# Patient Record
Sex: FEMALE | Race: WHITE | NOT HISPANIC OR LATINO | Employment: UNEMPLOYED | URBAN - METROPOLITAN AREA
[De-identification: names, ages, dates, MRNs, and addresses within clinical notes are randomized per-mention and may not be internally consistent; named-entity substitution may affect disease eponyms.]

---

## 2017-11-03 ENCOUNTER — HISTORICAL (OUTPATIENT)
Dept: INTERNAL MEDICINE | Facility: CLINIC | Age: 67
End: 2017-11-03

## 2017-11-03 LAB
ABS NEUT (OLG): 4.33 X10(3)/MCL (ref 2.1–9.2)
ALBUMIN SERPL-MCNC: 3.7 GM/DL (ref 3.4–5)
ALBUMIN/GLOB SERPL: 1 RATIO (ref 1–2)
ALP SERPL-CCNC: 115 UNIT/L (ref 45–117)
ALT SERPL-CCNC: 15 UNIT/L (ref 12–78)
AST SERPL-CCNC: 12 UNIT/L (ref 15–37)
BASOPHILS # BLD AUTO: 0.07 X10(3)/MCL
BASOPHILS NFR BLD AUTO: 1 % (ref 0–1)
BILIRUB SERPL-MCNC: 0.5 MG/DL (ref 0.2–1)
BILIRUBIN DIRECT+TOT PNL SERPL-MCNC: 0.1 MG/DL
BILIRUBIN DIRECT+TOT PNL SERPL-MCNC: 0.4 MG/DL
BUN SERPL-MCNC: 18 MG/DL (ref 7–18)
CALCIUM SERPL-MCNC: 8.9 MG/DL (ref 8.5–10.1)
CHLORIDE SERPL-SCNC: 107 MMOL/L (ref 98–107)
CHOLEST SERPL-MCNC: 191 MG/DL
CHOLEST/HDLC SERPL: 3.3 {RATIO} (ref 0–4.4)
CO2 SERPL-SCNC: 29 MMOL/L (ref 21–32)
CREAT SERPL-MCNC: 0.7 MG/DL (ref 0.6–1.3)
EOSINOPHIL # BLD AUTO: 0.21 X10(3)/MCL
EOSINOPHIL NFR BLD AUTO: 3 % (ref 0–5)
ERYTHROCYTE [DISTWIDTH] IN BLOOD BY AUTOMATED COUNT: 13.5 % (ref 11.5–14.5)
EST. AVERAGE GLUCOSE BLD GHB EST-MCNC: 111 MG/DL
GLOBULIN SER-MCNC: 4 GM/ML (ref 2.3–3.5)
GLUCOSE SERPL-MCNC: 80 MG/DL (ref 74–106)
HBA1C MFR BLD: 5.5 % (ref 4.2–6.3)
HCT VFR BLD AUTO: 42.7 % (ref 35–46)
HDLC SERPL-MCNC: 58 MG/DL
HGB BLD-MCNC: 14.1 GM/DL (ref 12–16)
IMM GRANULOCYTES # BLD AUTO: 0.02 10*3/UL
IMM GRANULOCYTES NFR BLD AUTO: 0 %
INR PPP: 1.01 (ref 0.9–1.2)
LDLC SERPL CALC-MCNC: 121 MG/DL (ref 0–130)
LYMPHOCYTES # BLD AUTO: 1.77 X10(3)/MCL
LYMPHOCYTES NFR BLD AUTO: 25 % (ref 15–40)
MCH RBC QN AUTO: 27.8 PG (ref 26–34)
MCHC RBC AUTO-ENTMCNC: 33 GM/DL (ref 31–37)
MCV RBC AUTO: 84.1 FL (ref 80–100)
MONOCYTES # BLD AUTO: 0.69 X10(3)/MCL
MONOCYTES NFR BLD AUTO: 10 % (ref 4–12)
NEUTROPHILS # BLD AUTO: 4.33 X10(3)/MCL
NEUTROPHILS NFR BLD AUTO: 61 X10(3)/MCL
PLATELET # BLD AUTO: 224 X10(3)/MCL (ref 130–400)
PMV BLD AUTO: 11.8 FL (ref 7.4–10.4)
POTASSIUM SERPL-SCNC: 4.8 MMOL/L (ref 3.5–5.1)
PROT SERPL-MCNC: 7.7 GM/DL (ref 6.4–8.2)
PROTHROMBIN TIME: 13.1 SECOND(S) (ref 11.9–14.4)
RBC # BLD AUTO: 5.08 X10(6)/MCL (ref 4–5.2)
SODIUM SERPL-SCNC: 142 MMOL/L (ref 136–145)
TRIGL SERPL-MCNC: 60 MG/DL
TSH SERPL-ACNC: <0.005 MIU/L (ref 0.36–3.74)
VLDLC SERPL CALC-MCNC: 12 MG/DL
WBC # SPEC AUTO: 7.1 X10(3)/MCL (ref 4.5–11)

## 2017-11-16 ENCOUNTER — HISTORICAL (OUTPATIENT)
Dept: ADMINISTRATIVE | Facility: HOSPITAL | Age: 67
End: 2017-11-16

## 2017-12-01 ENCOUNTER — HISTORICAL (OUTPATIENT)
Dept: INTERNAL MEDICINE | Facility: CLINIC | Age: 67
End: 2017-12-01

## 2017-12-01 LAB
ABS NEUT (OLG): 5.26 X10(3)/MCL (ref 2.1–9.2)
ALBUMIN SERPL-MCNC: 3.4 GM/DL (ref 3.4–5)
ALBUMIN/GLOB SERPL: 1 RATIO (ref 1–2)
ALP SERPL-CCNC: 119 UNIT/L (ref 45–117)
ALT SERPL-CCNC: 15 UNIT/L (ref 12–78)
AST SERPL-CCNC: 12 UNIT/L (ref 15–37)
BASOPHILS # BLD AUTO: 0.1 X10(3)/MCL
BASOPHILS NFR BLD AUTO: 1 % (ref 0–1)
BILIRUB SERPL-MCNC: 0.3 MG/DL (ref 0.2–1)
BILIRUBIN DIRECT+TOT PNL SERPL-MCNC: 0.1 MG/DL
BILIRUBIN DIRECT+TOT PNL SERPL-MCNC: 0.2 MG/DL
BUN SERPL-MCNC: 19 MG/DL (ref 7–18)
CALCIUM SERPL-MCNC: 8.5 MG/DL (ref 8.5–10.1)
CHLORIDE SERPL-SCNC: 107 MMOL/L (ref 98–107)
CHOLEST SERPL-MCNC: 164 MG/DL
CHOLEST/HDLC SERPL: 3.3 {RATIO} (ref 0–4.4)
CO2 SERPL-SCNC: 27 MMOL/L (ref 21–32)
CREAT SERPL-MCNC: 0.8 MG/DL (ref 0.6–1.3)
EOSINOPHIL # BLD AUTO: 0.2 10*3/UL
EOSINOPHIL NFR BLD AUTO: 2 % (ref 0–5)
ERYTHROCYTE [DISTWIDTH] IN BLOOD BY AUTOMATED COUNT: 13.3 % (ref 11.5–14.5)
EST. AVERAGE GLUCOSE BLD GHB EST-MCNC: 108 MG/DL
GLOBULIN SER-MCNC: 4 GM/ML (ref 2.3–3.5)
GLUCOSE SERPL-MCNC: 99 MG/DL (ref 74–106)
HBA1C MFR BLD: 5.4 % (ref 4.2–6.3)
HCT VFR BLD AUTO: 43 % (ref 35–46)
HDLC SERPL-MCNC: 49 MG/DL
HGB BLD-MCNC: 14.1 GM/DL (ref 12–16)
IMM GRANULOCYTES # BLD AUTO: 0.02 10*3/UL
IMM GRANULOCYTES NFR BLD AUTO: 0 %
LDLC SERPL CALC-MCNC: 96 MG/DL (ref 0–130)
LYMPHOCYTES # BLD AUTO: 2.16 X10(3)/MCL
LYMPHOCYTES NFR BLD AUTO: 25 % (ref 15–40)
MCH RBC QN AUTO: 27.3 PG (ref 26–34)
MCHC RBC AUTO-ENTMCNC: 32.8 GM/DL (ref 31–37)
MCV RBC AUTO: 83.2 FL (ref 80–100)
MONOCYTES # BLD AUTO: 0.84 X10(3)/MCL
MONOCYTES NFR BLD AUTO: 10 % (ref 4–12)
NEUTROPHILS # BLD AUTO: 5.26 X10(3)/MCL
NEUTROPHILS NFR BLD AUTO: 61 X10(3)/MCL
PLATELET # BLD AUTO: 247 X10(3)/MCL (ref 130–400)
PMV BLD AUTO: 11.3 FL (ref 7.4–10.4)
POTASSIUM SERPL-SCNC: 4.6 MMOL/L (ref 3.5–5.1)
PROT SERPL-MCNC: 7.4 GM/DL (ref 6.4–8.2)
RBC # BLD AUTO: 5.17 X10(6)/MCL (ref 4–5.2)
SODIUM SERPL-SCNC: 143 MMOL/L (ref 136–145)
T3FREE SERPL-MCNC: 2.99 PG/ML (ref 2.18–3.98)
T4 FREE SERPL-MCNC: 2.03 NG/DL (ref 0.76–1.46)
TRIGL SERPL-MCNC: 93 MG/DL
TSH SERPL-ACNC: <0.005 MIU/L (ref 0.36–3.74)
VLDLC SERPL CALC-MCNC: 19 MG/DL
WBC # SPEC AUTO: 8.6 X10(3)/MCL (ref 4.5–11)

## 2018-02-01 ENCOUNTER — HISTORICAL (OUTPATIENT)
Dept: ADMINISTRATIVE | Facility: HOSPITAL | Age: 68
End: 2018-02-01

## 2018-02-01 LAB
APTT PPP: 31.2 SECOND(S) (ref 23.3–37)
BUN SERPL-MCNC: 18 MG/DL (ref 7–18)
CALCIUM SERPL-MCNC: 8.8 MG/DL (ref 8.5–10.1)
CHLORIDE SERPL-SCNC: 108 MMOL/L (ref 98–107)
CO2 SERPL-SCNC: 25 MMOL/L (ref 21–32)
CREAT SERPL-MCNC: 0.5 MG/DL (ref 0.6–1.3)
ERYTHROCYTE [DISTWIDTH] IN BLOOD BY AUTOMATED COUNT: 14.7 % (ref 11.5–14.5)
GLUCOSE SERPL-MCNC: 92 MG/DL (ref 74–106)
HCT VFR BLD AUTO: 40.3 % (ref 35–46)
HGB BLD-MCNC: 13.4 GM/DL (ref 12–16)
INR PPP: 1.11 (ref 0.9–1.2)
MCH RBC QN AUTO: 27.7 PG (ref 26–34)
MCHC RBC AUTO-ENTMCNC: 33.3 GM/DL (ref 31–37)
MCV RBC AUTO: 83.4 FL (ref 80–100)
PLATELET # BLD AUTO: 238 X10(3)/MCL (ref 130–400)
PMV BLD AUTO: 11.7 FL (ref 7.4–10.4)
POTASSIUM SERPL-SCNC: 4.1 MMOL/L (ref 3.5–5.1)
PROTHROMBIN TIME: 13.6 SECOND(S) (ref 11.9–14.4)
RBC # BLD AUTO: 4.83 X10(6)/MCL (ref 4–5.2)
SODIUM SERPL-SCNC: 141 MMOL/L (ref 136–145)
WBC # SPEC AUTO: 8.1 X10(3)/MCL (ref 4.5–11)

## 2018-04-26 ENCOUNTER — HISTORICAL (OUTPATIENT)
Dept: INTERNAL MEDICINE | Facility: CLINIC | Age: 68
End: 2018-04-26

## 2018-04-26 LAB
ABS NEUT (OLG): 4.93 X10(3)/MCL (ref 2.1–9.2)
ALBUMIN SERPL-MCNC: 3.6 GM/DL (ref 3.4–5)
ALBUMIN/GLOB SERPL: 1 RATIO (ref 1–2)
ALP SERPL-CCNC: 125 UNIT/L (ref 45–117)
ALT SERPL-CCNC: 16 UNIT/L (ref 12–78)
AST SERPL-CCNC: 16 UNIT/L (ref 15–37)
BASOPHILS # BLD AUTO: 0.11 X10(3)/MCL
BASOPHILS NFR BLD AUTO: 1 %
BILIRUB SERPL-MCNC: 0.3 MG/DL (ref 0.2–1)
BILIRUBIN DIRECT+TOT PNL SERPL-MCNC: 0.1 MG/DL
BILIRUBIN DIRECT+TOT PNL SERPL-MCNC: 0.2 MG/DL
BUN SERPL-MCNC: 15 MG/DL (ref 7–18)
CALCIUM SERPL-MCNC: 8.5 MG/DL (ref 8.5–10.1)
CHLORIDE SERPL-SCNC: 110 MMOL/L (ref 98–107)
CHOLEST SERPL-MCNC: 151 MG/DL
CHOLEST/HDLC SERPL: 2.5 {RATIO} (ref 0–4.4)
CO2 SERPL-SCNC: 26 MMOL/L (ref 21–32)
CREAT SERPL-MCNC: 0.8 MG/DL (ref 0.6–1.3)
DEPRECATED CALCIDIOL+CALCIFEROL SERPL-MC: 16.1 NG/ML (ref 30–80)
EOSINOPHIL # BLD AUTO: 0.2 10*3/UL
EOSINOPHIL NFR BLD AUTO: 2 %
ERYTHROCYTE [DISTWIDTH] IN BLOOD BY AUTOMATED COUNT: 13.7 % (ref 11.5–14.5)
EST. AVERAGE GLUCOSE BLD GHB EST-MCNC: 108 MG/DL
GLOBULIN SER-MCNC: 4.1 GM/ML (ref 2.3–3.5)
GLUCOSE SERPL-MCNC: 101 MG/DL (ref 74–106)
HBA1C MFR BLD: 5.4 % (ref 4.2–6.3)
HCT VFR BLD AUTO: 45.1 % (ref 35–46)
HDLC SERPL-MCNC: 61 MG/DL
HGB BLD-MCNC: 14.4 GM/DL (ref 12–16)
IMM GRANULOCYTES # BLD AUTO: 0.01 10*3/UL
IMM GRANULOCYTES NFR BLD AUTO: 0 %
LDLC SERPL CALC-MCNC: 80 MG/DL (ref 0–130)
LYMPHOCYTES # BLD AUTO: 1.93 X10(3)/MCL
LYMPHOCYTES NFR BLD AUTO: 24 % (ref 13–40)
MCH RBC QN AUTO: 26.6 PG (ref 26–34)
MCHC RBC AUTO-ENTMCNC: 31.9 GM/DL (ref 31–37)
MCV RBC AUTO: 83.4 FL (ref 80–100)
MONOCYTES # BLD AUTO: 0.72 X10(3)/MCL
MONOCYTES NFR BLD AUTO: 9 % (ref 4–12)
NEUTROPHILS # BLD AUTO: 4.93 X10(3)/MCL
NEUTROPHILS NFR BLD AUTO: 62 X10(3)/MCL
PLATELET # BLD AUTO: 254 X10(3)/MCL (ref 130–400)
PMV BLD AUTO: 11.9 FL (ref 7.4–10.4)
POTASSIUM SERPL-SCNC: 4.2 MMOL/L (ref 3.5–5.1)
PROT SERPL-MCNC: 7.7 GM/DL (ref 6.4–8.2)
RBC # BLD AUTO: 5.41 X10(6)/MCL (ref 4–5.2)
SODIUM SERPL-SCNC: 141 MMOL/L (ref 136–145)
T4 FREE SERPL-MCNC: 1.47 NG/DL (ref 0.76–1.46)
TRIGL SERPL-MCNC: 49 MG/DL
TSH SERPL-ACNC: <0.005 MIU/L (ref 0.36–3.74)
VLDLC SERPL CALC-MCNC: 10 MG/DL
WBC # SPEC AUTO: 7.9 X10(3)/MCL (ref 4.5–11)

## 2018-05-17 ENCOUNTER — HISTORICAL (OUTPATIENT)
Dept: RADIOLOGY | Facility: HOSPITAL | Age: 68
End: 2018-05-17

## 2018-05-22 ENCOUNTER — HISTORICAL (OUTPATIENT)
Dept: ADMINISTRATIVE | Facility: HOSPITAL | Age: 68
End: 2018-05-22

## 2018-05-22 LAB
APTT PPP: 31.9 SECOND(S) (ref 23.3–37)
INR PPP: 1.07 (ref 0.9–1.2)
PROTHROMBIN TIME: 13.2 SECOND(S) (ref 11.9–14.4)

## 2018-12-19 ENCOUNTER — HISTORICAL (OUTPATIENT)
Dept: RADIOLOGY | Facility: HOSPITAL | Age: 68
End: 2018-12-19

## 2020-12-29 ENCOUNTER — HISTORICAL (OUTPATIENT)
Dept: ADMINISTRATIVE | Facility: HOSPITAL | Age: 70
End: 2020-12-29

## 2021-01-27 ENCOUNTER — HISTORICAL (OUTPATIENT)
Dept: RADIOLOGY | Facility: HOSPITAL | Age: 71
End: 2021-01-27

## 2021-02-10 ENCOUNTER — HISTORICAL (OUTPATIENT)
Dept: CARDIOLOGY | Facility: HOSPITAL | Age: 71
End: 2021-02-10

## 2022-04-09 ENCOUNTER — HISTORICAL (OUTPATIENT)
Dept: ADMINISTRATIVE | Facility: HOSPITAL | Age: 72
End: 2022-04-09

## 2022-04-13 ENCOUNTER — HISTORICAL (OUTPATIENT)
Dept: RADIOLOGY | Facility: HOSPITAL | Age: 72
End: 2022-04-13

## 2022-04-13 ENCOUNTER — HISTORICAL (OUTPATIENT)
Dept: ADMINISTRATIVE | Facility: HOSPITAL | Age: 72
End: 2022-04-13

## 2022-04-27 VITALS
DIASTOLIC BLOOD PRESSURE: 78 MMHG | WEIGHT: 90.38 LBS | OXYGEN SATURATION: 100 % | BODY MASS INDEX: 18.97 KG/M2 | SYSTOLIC BLOOD PRESSURE: 148 MMHG | HEIGHT: 58 IN

## 2022-04-30 NOTE — OP NOTE
Patient:   Luz Elena Saunders             MRN: [id number]            FIN: [id number]               Age:   66 years     Sex:  Female     :  1950   Associated Diagnoses:   None   Author:   Shane Bull MD      Preoperative Diagnosis: Cataract Left eye    Postoperative Diagnosis: Cataract Left eye    Procedure: Phacoemulsification with intaocular lens implantations Left eye    Surgeon: Shane Bull MD    Assistant: Vanesa Cerna [facility]    Anestheisa: Topical    Complications: None    The patient was brought into the operating suite, where the patient was correctly identified as was the operative eye via timeout.  The patient was prepped and draped in a sterile ophthalmic fashion.  A lid speculum was placed in the operative eye and the microscope was brought into place.  A 1.0mm paracentesis was then made at (6) o'clock.  The anterior chamber was filled with Endocoat.  A (temporal) clear corneal incision was made with a 2.4 mm keratome.  A 6 mm corneal marking ring was used to gunner the cornea centered over the visual axis.  A 5.00 mm continuous curvilinear capsulorhexis was fashioned using a cystotome and microcapsular forceps.  Hydrodissection and hydrodelineation was performed with upreserved 1% Xylocaine.  The nucleus was then phacoemulsified with the Abbott phacoemulsification hand-piece with a total of (24) EFX.  The cortex was then removed with the Dionisio I/A hand-piece. An ARTEMIO lens model (ZCB00) with a power of (23.0) was placed in the capsular bag.  The Helon was then removed from the eye with the Dionisio I/A hand piece. The anterior chamber was inflated and the wounds were hydrated with BSS.  The wounds were checked with Weck-Georgina sponges and found to be watertight.  The lid speculum was removed and topical antibiotics were placed on the operative eye.  The patient was brought to PACU in good condition.      Surgery Date 17 [facility]

## 2022-04-30 NOTE — PROGRESS NOTES
"   Patient:   Luz Elena Saunders             MRN: [id number]            FIN: [id number]               Age:   67 years     Sex:  Female     :  1950   Associated Diagnoses:   Hyperlipidemia; HTN (hypertension); Family history of stroke; Family history of myocardial infarction; Angina pectoris   Author:   Seun Lusi MD      Chief Complaint   Abnormal EKG      History of Present Illness   Pt is 68 yo WF, referred by PCP for evaluation of abnormal EKG, obtained 2017 for right eye cataract surgery.   Reports experiencing substernal chest discomfort, "tingling feeling", started 1-2 months ago, lasts 20 seconds, denied provoking factors, not related to physical activity, "could come at anytime". Accompanied by left arm pain, starts on shoulder and radiates to elbow.   Pt is able to walk for more than 2 hours without experiencing symptoms.     Denies history of smoking or drug use  EtoH: 1-2 small "6 oz" every 2 months. Did not specify type  FM hx: Father  of MI 69yo, mother  of stroke at 69yo, son recently had "multiple bypass surgery"  Pt denies hx of stroke, MI or syncope   Hospitalized 2 months ago for flu and CAP.    No other issues or concerns.      Review of Systems   Constitutional:  No sweats, No weakness, No fatigue.    Respiratory:  No shortness of breath, No cough.    Cardiovascular:  No peripheral edema.    Gastrointestinal:  No heartburn.    Musculoskeletal:  No claudication.    Neurologic:  No abnormal balance, No headache.       Health Status   Current medications:  (Selected)   Prescriptions  Prescribed  levothyroxine 150 mcg (0.15 mg) oral tablet: 150 mcg = 1 tab(s), Oral, Daily, # 30 tab(s), 0 Refill(s), other reason (Rx)  Documented Medications  Documented  Pravachol 40 mg oral tablet: 40 mg = 1 tab(s), Oral, Daily, # 30 tab(s), 0 Refill(s)  aspirin 81 mg oral Delayed Release (EC) tablet: 81 mg = 1 tab(s), Oral, Daily, # 30 tab(s), 0 Refill(s)  metoprolol tartrate 25 mg oral tab: " 25 mg = 1 tab(s), Oral, BID, # 60 tab(s), 0 Refill(s)   Denied taking Paxil or Xanax.       Physical Examination   Vital Signs   2/1/2018 9:50 CST        Temperature Oral          36.9 DegC                             Temperature Oral (calculated)             98.42 DegF                             Peripheral Pulse Rate     76 bpm                             Respiratory Rate          20 br/min                             SpO2                      98 %                             Systolic Blood Pressure   132 mmHg                             Diastolic Blood Pressure  75 mmHg     General:  Alert and oriented, No acute distress.    Neck:  No thyromegaly.    Respiratory:  Lungs are clear to auscultation, Breath sounds are equal.    Cardiovascular:  RRR, no murmur, +2radial, No JVD.    Integumentary:  scattered nodules on neck,  chest, back and upper extremities. Associated with neurofibromatosis.       Review / Management   ECG interpretation:  Time  2/1/2018 10:55:00, Rate  73  beats per minute, left shift, NSR. See EKG on EMR.       Impression and Plan   Diagnosis     Hyperlipidemia (UKR36-GW E78.5).     HTN (hypertension) (AGS06-XV I10).     Family history of stroke (WEN62-YW Z82.3).     Family history of myocardial infarction (TWG06-BQ Z82.49).     Angina pectoris (TLM44-RQ I20.9).     -Home meds reviewed with pt, did not request refills, continue taking as instructed.  -Ordered LHC, TTE, TSH, free T4 and Vit D.  -Will see pt 2 weeks after LHC.   -Follow up with previously scheduled appointments  -Go to closest hospital if experience chest pain, SOB, dizziness, change in vision, abnormal balance or sever headache.  -Pt agreed with above plan and expressed verbal understanding

## 2022-04-30 NOTE — OP NOTE
Patient:   Luz Elena Saunders             MRN: [id number]            FIN: [id number]               Age:   70 years     Sex:  Female     :  1950   Associated Diagnoses:   None   Author:   Shane Bull MD      Preoperative Diagnosis: Cataract Right eye    Postoperative Diagnosis: Cataract Right eye    Procedure: Phacoemulsification with intaocular lens implantations Right eye    Surgeon: Shane Bull MD    Assistant: Vanesa Cerna, [facility]    Anestheisa: MAC    Complications: None    The patient was brought into the operating suite, where the patient was correctly identified as was the operative eye via timeout.  The patient was prepped and draped in a sterile ophthalmic fashion.  A lid speculum was placed in the operative eye and the microscope was brought into place.  A 1.0mm paracentesis was then made at (12) o'clock.  The anterior chamber was filled with Endocoat.  A (temporal) clear corneal incision was made with a 2.4 mm keratome.  A 6 mm corneal marking ring was used to gunner the cornea centered over the visual axis.  A 5.00 mm continuous curvilinear capsulorhexis was fashioned using a cystotome and microcapsular forceps.  Hydrodissection and hydrodelineation was performed with upreserved 1% Xylocaine.  The nucleus was then phacoemulsified with the Abbott phacoemulsification hand-piece with a total of (31) EFX.  The cortex was then removed with the I/A hand-piece. The lens model (ZCB00) with a power of (25.0) was placed in the capsular bag.  The Helon was then removed from the eye with the I/A hand piece.  The anterior chamber was inflated and the wounds were hydrated with BSS.  The wounds were checked with Weck-Georgina sponges and found to be watertight.  The lid speculum was removed and topical antibiotics were placed on the operative eye.  The patient was brought to PACU in good condition.

## 2022-05-02 NOTE — HISTORICAL [facility] CERNER
This is a historical note converted from Cerjosh. Formatting and pictures may have been removed.  Please reference Cerjosh for original formatting and attached multimedia. Chief Complaint  Here for stress test results. Continues to have chest discomfort. Remains SOB with ordinary activity. Sleeps on 2 pillows.  History of Present Illness  67-year-old female?with history of hypertension, hypothyroidism,?neurofibromatosis,?presents with?chest discomfort?and abnormal Lexiscan.? She endorses?chest pain at rest as well as?with exertion.? Her Lexiscan was significantly abnormal?and showed a reversible defect concerning for?obstructive CAD.? After discussing risks?and benefits?with patient,?explaining procedure, and answering questions?ultimately?decided?to proceed with diagnostic?cath.  Review of Systems  Constitutional_No wt loss, no fevers/chills  Eye_no visual changes  ENMT_no rhinorrhea  Respiratory_no SOB  Cardiovascular_chest pain  Gastrointestinal_no change in bowel freq  Genitourinary_no change in bladder freq  Hema/Lymph_no easy bruising/bleeding  Endocrine_no intolerance to heat/cold  Immunologic_no freq/recent infections  Musculoskeletal_no myalgias, no weakness  Integumentary_no arthralgias  Neurologic_no paresthesias  All Other ROS_negative  Physical Exam  Vitals & Measurements  T:?36.8? ?C (Oral)? HR:?64(Peripheral)? RR:?18? BP:?134/80? SpO2:?100%? WT:?61?kg? WT:?61?kg?  General_NAD  Developmental Milestones_  Eye_EOMI  HENT_NC/AT  Neck_no JVD  Respiratory_CTAB  Cardiovascular_RRR, no M/G/R  Gastrointestinal_NTTP  Genitourinary_  Lymphatics_no palpable LNs  Musculoskeletal_normal ROM  Integumentary_no joint swelling  Neurologic_patient has multiple nodules visible around chest and neck consistent with neurofibromatosis  Assessment/Plan  1.?Cardiac angina  ? Patient has a codeine allergy,?will receive prednisone?per?protocol.? Consents have been obtained?for?left heart cath and?core angiogram?tomorrow.  2.?HTN  (hypertension)  ? Continue Coreg,?patients blood pressure?reasonably?well-controlled  Chest pain  ? No present chest pain.? Will treat with nitroglycerin?if recurs. ?Patient understands to come to the emergency department?if symptomatic overnight.?Catheterization planned for tomorrow.   Problem List/Past Medical History  Ongoing  Able to lie down  Activity tolerance  Cataract  HTN (hypertension)  Hypothyroidism  Migraine  Neurofibromatosis  Historical  No qualifying data  Procedure/Surgical History  33512 - LT CATARACT W/IOL 1 STA PHACO (Left) (11/16/2017), Extracapsular cataract removal with insertion of intraocular lens prosthesis (1 stage procedure), manual or mechanical technique (eg, irrigation and aspiration or phacoemulsification) (11/16/2017), Replacement of Left Lens with Synthetic Substitute, Percutaneous Approach (11/16/2017), Cholecystectomy, colonoscopy, eye surgery, Hysterectomy, nerve removed rt arm, ESTEBAN BSO - Total abdominal hysterectomy and bilateral salpingo-oophorectomy.  Medications  aspirin 81 mg oral Delayed Release (EC) tablet, 81 mg= 1 tab(s), Oral, Daily  Coreg 6.25 mg oral tablet, 6.25 mg= 1 tab(s), Oral, BID, 6 refills  levothyroxine 150 mcg (0.15 mg) oral tablet, 150 mcg= 1 tab(s), Oral, Daily, 11 refills  paroxetine 20 mg oral tablet, 20 mg= 1 tab(s), Oral, Daily, 11 refills  Pravachol 40 mg oral tablet, 40 mg= 1 tab(s), Oral, Daily  prednisONE 50 mg oral tablet, See Instructions  SUMAtriptan 50 mg oral tablet, 50 mg= 1 tab(s), Oral, Daily, 1 refills  Allergies  codeine?(nausea)  contrast media (iodine-based)  Social History  Alcohol - Denies Alcohol Use, 08/27/2015  Never, 05/10/2016  Employment/School  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 Operates hazardous equipment: No., 12/08/2016  Work/School description: homemaker. Operates hazardous equipment: No., 12/08/2016  Exercise  Self assessment: Fair condition., 05/10/2016  Home/Environment  Lives with son. Living situation:  Home/Independent. Alcohol abuse in household: No. Substance abuse in household: No. Smoker in household: No. Injuries/Abuse/Neglect in household: No. Feels unsafe at home: No. Safe place to go: Yes. Family/Friends available for support: Yes. Concern for family members at home: No. Major illness in household: No. Financial concerns: No. TV/Computer concerns: No., 12/08/2016  Lives with Children. Living situation: Home/Independent. Alcohol abuse in household: No. Substance abuse in household: No. Smoker in household: No. Injuries/Abuse/Neglect in household: No. Feels unsafe at home: No. Family/Friends available for support: Yes. Concern for family members at home: No. Financial concerns: No. TV/Computer concerns: No., 12/08/2016  Nutrition/Health  Regular, Caffeine intake amount: 2 soft drinks a day. Sleeping concerns: No. Feels highly stressed: No., 05/10/2016  Substance Abuse - Denies Substance Abuse, 08/27/2015  Never, 05/10/2016  Tobacco - Denies Tobacco Use, 08/27/2015  Never smoker, 05/10/2016  Family History  Acute myocardial infarction.: Father.  Congestive heart disease.: Father.  DIABETES MELLITUS: Grandmother.  Heart failure.: Father.  Hypertension.: Mother, Father, Son and Grandmother.  Stroke: Mother.  Lab Results  Lab results reviewed, normal creatinine, normal?hemoglobin,?normal platelets,?PT and INR?will be drawn prior to procedure  Diagnostic Results  Lexiscan showed?considerable?territory with?reversible?defect      I was present with the resident during the history and exam.  ? ?  [x? ] I discussed the case with the resident and agree with the findings and plan as documented in the resident?s note.  [ ] I discussed the case with the resident and agree with the findings and plan as documented in the resident?s note except: [ ]  ?

## 2022-06-09 ENCOUNTER — OFFICE VISIT (OUTPATIENT)
Dept: CARDIOLOGY | Facility: CLINIC | Age: 72
End: 2022-06-09
Payer: COMMERCIAL

## 2022-06-09 VITALS
SYSTOLIC BLOOD PRESSURE: 126 MMHG | HEART RATE: 81 BPM | OXYGEN SATURATION: 99 % | TEMPERATURE: 98 F | WEIGHT: 101.88 LBS | RESPIRATION RATE: 18 BRPM | HEIGHT: 59 IN | DIASTOLIC BLOOD PRESSURE: 67 MMHG | BODY MASS INDEX: 20.54 KG/M2

## 2022-06-09 DIAGNOSIS — E03.8 OTHER SPECIFIED HYPOTHYROIDISM: ICD-10-CM

## 2022-06-09 DIAGNOSIS — R06.02 SOB (SHORTNESS OF BREATH) ON EXERTION: ICD-10-CM

## 2022-06-09 DIAGNOSIS — Z95.1 HX OF CABG: ICD-10-CM

## 2022-06-09 DIAGNOSIS — I25.10 CAD IN NATIVE ARTERY: ICD-10-CM

## 2022-06-09 DIAGNOSIS — I10 PRIMARY HYPERTENSION: ICD-10-CM

## 2022-06-09 DIAGNOSIS — I25.110 ATHEROSCLEROSIS OF NATIVE CORONARY ARTERY OF NATIVE HEART WITH UNSTABLE ANGINA PECTORIS: Primary | ICD-10-CM

## 2022-06-09 PROCEDURE — 93005 ELECTROCARDIOGRAM TRACING: CPT

## 2022-06-09 PROCEDURE — 99215 OFFICE O/P EST HI 40 MIN: CPT | Mod: PBBFAC | Performed by: INTERNAL MEDICINE

## 2022-06-09 RX ORDER — NITROGLYCERIN 0.4 MG/1
0.4 TABLET SUBLINGUAL
Status: DISCONTINUED | OUTPATIENT
Start: 2022-06-09 | End: 2023-01-11

## 2022-06-09 RX ORDER — ISOSORBIDE MONONITRATE 30 MG/1
30 TABLET, EXTENDED RELEASE ORAL DAILY
Qty: 30 TABLET | Refills: 11 | Status: SHIPPED | OUTPATIENT
Start: 2022-06-09 | End: 2023-03-28 | Stop reason: SDUPTHER

## 2022-06-09 RX ORDER — CYCLOBENZAPRINE HCL 5 MG
5 TABLET ORAL 3 TIMES DAILY PRN
COMMUNITY
Start: 2022-01-31 | End: 2022-09-19

## 2022-06-09 RX ORDER — ERGOCALCIFEROL 1.25 MG/1
1 CAPSULE ORAL
COMMUNITY
Start: 2022-04-06 | End: 2022-09-19

## 2022-06-09 RX ORDER — ATORVASTATIN CALCIUM 80 MG/1
80 TABLET, FILM COATED ORAL DAILY
COMMUNITY
Start: 2022-05-09 | End: 2022-09-19 | Stop reason: SDUPTHER

## 2022-06-09 RX ORDER — METOPROLOL SUCCINATE 100 MG/1
100 TABLET, EXTENDED RELEASE ORAL
COMMUNITY
Start: 2021-09-02 | End: 2022-09-19 | Stop reason: SDUPTHER

## 2022-06-09 RX ORDER — PAROXETINE HYDROCHLORIDE 20 MG/1
20 TABLET, FILM COATED ORAL
COMMUNITY
End: 2023-01-11 | Stop reason: SDUPTHER

## 2022-06-09 RX ORDER — RIZATRIPTAN BENZOATE 10 MG/1
TABLET ORAL
COMMUNITY
Start: 2021-10-05 | End: 2023-05-30 | Stop reason: SDUPTHER

## 2022-06-09 RX ORDER — LEVOTHYROXINE SODIUM 88 UG/1
88 TABLET ORAL DAILY
COMMUNITY
Start: 2022-05-09 | End: 2023-01-11 | Stop reason: SDUPTHER

## 2022-06-09 RX ORDER — LISINOPRIL 10 MG/1
10 TABLET ORAL DAILY
COMMUNITY
Start: 2022-04-25 | End: 2022-09-19 | Stop reason: SDUPTHER

## 2022-06-09 RX ORDER — NAPROXEN 500 MG/1
500 TABLET ORAL 2 TIMES DAILY
COMMUNITY
Start: 2022-05-11 | End: 2022-09-19 | Stop reason: SDUPTHER

## 2022-06-09 RX ORDER — ASPIRIN 81 MG/1
81 TABLET ORAL
COMMUNITY
End: 2023-05-30 | Stop reason: SDUPTHER

## 2022-06-09 NOTE — MEDICAL/APP STUDENT
[facility] Cardiology clinic     Subjective:        Luz Elena Saunders is a 71 y.o. female with PMH CAD (s/p CABGx3 May 2018), a fib (noted only post CABG), HTN, hypothyroidism, migraines, neurofibromatosis, osteoporosis presenting for follow up appointment.     Patient reports chest pain with dyspnea on exertion, which has worsened since a recent fall several months ago. She says she's unable to walk more than 20 feet without becoming short of breath or having chest pain. She describes the chest pain as squeezing, localized to the right side, and can last for up to 2 hours with each episode. These occur usually 2-3 times per week. She also reports palpitations both with overexertion and stress, and when doing daily chores (like making the bed). Denies PND, dizziness, syncope.     Last seen in this clinic on 10/06/20 at which time she was reporting similar symptoms. Holter monitor and echo were ordered, but she was somehow lost to follow-up.      Review of Systems:  10 point ROS negative except for HPI    Objective:   Vital Signs:  Vitals:    06/09/22 1355   BP: 126/67   Pulse: 81   Resp: 18   Temp: 98.1 °F (36.7 °C)        Body mass index is 20.53 kg/m².     General:  NAD, thin   Head: Normocephalic, atraumatic  Eyes: PERRL, EOMI, anicteric sclera  Throat: No posterior pharyngeal erythema or exudate  Neck: supple, normal ROM, no thyromegaly, scattered neuromas over neck  CVS:  RRR, S1 and S2 normal, no murmurs, no added heart sounds, rubs, gallops, 2+ peripheral pulses  Resp:  Lungs clear to auscultation bilaterally, no wheezes, rales, or rhonci  GI:  Abdomen soft, non-tender, non-distended, normoactive bowel sounds  MSK:  No muscle atrophy, cyanosis, peripheral edema, full range of motion  Skin:  No rashes, ulcers, erythema  Neuro:  Alert and oriented x3, No focal neuro deficits, CNII-XII grossly intact  Psych:  Appropriate mood and affect     Echocardiogram from 1/27/2021:  Normal LV size and function  LVEF 55%  Mild  MR  Mild AV sclerosis  Mild TR, RVSP 35  Moderate dilated left atrium  Known PFO with left-to-right shunt  IVC dilated    48-hour holter monitor: 2/10/2021:   Sinus rhythm with average heart rate 73 bpm, no episodes of tachycardia, no episodes of bradycardia  Short burst of SVT was 5 beats at 122 bpm  Longest V beat was 3 beats at 97 bpm   No diary returned    EKG (06/09/22):   normal sinus rhythm, T wave inversions in V1-V3, not present in prior EKGs           Laboratory:  Lab Results   Component Value Date    WBC 11.31 (H) 11/01/2018    HGB 11.8 11/01/2018    HCT 35.5 11/01/2018     11/01/2018    MCV 80.1 (L) 11/01/2018    RDW 16 11/01/2018     Lab Results   Component Value Date    HGBA1C 5.3 05/31/2018     05/31/2018    CREATININE 1.00 11/01/2018    Lab Results   Component Value Date     11/01/2018    K 3.6 11/01/2018    CO2 24.2 11/01/2018    BUN 13 11/01/2018    CREATININE 1.00 11/01/2018    CALCIUM 8.4 (L) 11/01/2018    MG 2.0 06/02/2018     Lab Results   Component Value Date    TSH <0.005 (L) 04/26/2018    [id number] 1.47 (H) 04/26/2018          Current Medications:  Current Outpatient Medications   Medication Instructions    aspirin (ECOTRIN) 81 mg, Oral    atorvastatin (LIPITOR) 80 mg, Oral, Daily    cyclobenzaprine (FLEXERIL) 5 mg, Oral, 3 times daily PRN    ergocalciferol (ERGOCALCIFEROL) 50,000 unit Cap 1 capsule, Oral    levothyroxine (SYNTHROID) 88 mcg, Oral, Daily    lisinopriL 10 mg, Oral, Daily    metoprolol succinate (TOPROL-XL) 100 mg, Oral    naproxen (NAPROSYN) 500 mg, Oral, 2 times daily    paroxetine (PAXIL) 20 mg, Oral    rivaroxaban (XARELTO) 20 mg, Oral    rizatriptan (MAXALT) 10 MG tablet TAKE ONE tablet by MOUTH as needed FOR MIGRAINE. MAY REPEAT in TWO hours IF needed. DO not exceed THREE TABLETS in 24 hours        Assessment and Plan:   CAD (s/p CABGx3 05/18)  Afib  Angina   HTN  Hypothyroidism   SOB (BAUTISTA with walking 20 feet)     -Repeat echo  -Nuclear  stress test   -Rx for imdur and PRN SL nitro sent   -Patient wishes to stop xarelto as she bruises easily. Had in-depth discussion with her regarding the risks vs benefits of discontinuing xarelto with her history of afib. Although history is remote (likely that she had it either right after or during her CABG), she hasn't had any documented episodes of afib since then. She understands the risks of discontinuing xarelto, and is amendable to restarting it should she have another episode of afib in the future.   -Follow up in 4 months       Isabella Triplett  (Medical Student)      Cardiology Attending    I evaluated Ms. Luz Elena Saunders and discussed her symptoms, findings, and management plan with the medical student.  I agree with the H&P and Assessment & Plan of the Cardiology Clinic Note.  (The note has been edited by me.)     The patient had Afib at the time of her CABG, but denies any recurrence.  Her Holter did not reveal any Afib.  The patient would prefer to stop Xarelto.  She understands there may be a small risk of undiagnosed Afib that may potentially cause a CVA.   Patient is agreeable to resuming anticoagulation if Afib is diagnosed in the future.       Rodney Tucker MD

## 2022-06-09 NOTE — PATIENT INSTRUCTIONS
STOP XARELTO    START IMDUR 30MG DAILY    USE NITRO UNDER TONGUE AS INSTRUCTED FOR CHEST PAIN    SCHEDULE STRESS TEST AND ECHO BEFORE RETURN APPOINTMENT- SEE SHEET WITH PHONE NUMBERS GIVEN TO YOU

## 2022-06-09 NOTE — PROGRESS NOTES
[facility] Cardiology clinic     Subjective:        Luz Elena Saunders is a 71 y.o. female who  has a past medical history of Carotid artery occlusion, Hypertension, Migraine, and Neurofibromatosis.  She presents to clinic today for follow-up of chronic medical conditions. ***.    Review of Systems:  10 point ROS negative except for HPI    Objective:   Vital Signs:  Vitals:    06/09/22 1355   BP: 126/67   Pulse: 81   Resp: 18   Temp: 98.1 °F (36.7 °C)        Body mass index is 20.53 kg/m².     General:  Well developed, well nourished, no acute respiratory distress  Head: Normocephalic, atraumatic  Eyes: PERRL, EOMI, anicteric sclera  Throat: No posterior pharyngeal erythema or exudate, no tonsillar exudate  Neck: supple, normal ROM, no thyromegaly   CVS:  RRR, S1 and S2 normal, no murmurs, no added heart sounds, rubs, gallops, 2+ peripheral pulses  Resp:  Lungs clear to auscultation bilaterally, no wheezes, rales, or rhonci  GI:  Abdomen soft, non-tender, non-distended, normoactive bowel sounds  MSK:  No muscle atrophy, cyanosis, peripheral edema, full range of motion  Skin:  No rashes, ulcers, erythema  Neuro:  Alert and oriented x3, No focal neuro deficits, CNII-XII grossly intact  Psych:  Appropriate mood and affect     Echocardiogram from 1/27/2021:  Normal LV size and function  LVEF 55%  Mild MR  Mild AV sclerosis  Mild TR, RVSP 35  Moderate dilated left atrium  Known PFO with left-to-right shunt  IVC dilated    Holter monitor: 2/10/2021:   Sinus rhythm with average heart rate 73 bpm, no episodes of tachycardia, no episodes of bradycardia  Short burst of SVT was 5 beats at 122 bpm  Longest V beat was 3 beats at 97 bpm   No diary returned          Laboratory:  Lab Results   Component Value Date    WBC 11.31 (H) 11/01/2018    HGB 11.8 11/01/2018    HCT 35.5 11/01/2018     11/01/2018    MCV 80.1 (L) 11/01/2018    RDW 16 11/01/2018     Lab Results   Component Value Date    HGBA1C 5.3 05/31/2018     05/31/2018     CREATININE 1.00 11/01/2018    Lab Results   Component Value Date     11/01/2018    K 3.6 11/01/2018    CO2 24.2 11/01/2018    BUN 13 11/01/2018    CREATININE 1.00 11/01/2018    CALCIUM 8.4 (L) 11/01/2018    MG 2.0 06/02/2018     Lab Results   Component Value Date    TSH <0.005 (L) 04/26/2018    [id number] 1.47 (H) 04/26/2018          Anemia: No results found for: IRON, TIBC, FERRITIN, [id number], FOLATE    Current Medications:  Current Outpatient Medications   Medication Instructions    aspirin (ECOTRIN) 81 mg, Oral    atorvastatin (LIPITOR) 80 mg, Oral, Daily    cyclobenzaprine (FLEXERIL) 5 mg, Oral, 3 times daily PRN    ergocalciferol (ERGOCALCIFEROL) 50,000 unit Cap 1 capsule, Oral    levothyroxine (SYNTHROID) 88 mcg, Oral, Daily    lisinopriL 10 mg, Oral, Daily    metoprolol succinate (TOPROL-XL) 100 mg, Oral    naproxen (NAPROSYN) 500 mg, Oral, 2 times daily    paroxetine (PAXIL) 20 mg, Oral    rivaroxaban (XARELTO) 20 mg, Oral    rizatriptan (MAXALT) 10 MG tablet TAKE ONE tablet by MOUTH as needed FOR MIGRAINE. MAY REPEAT in TWO hours IF needed. DO not exceed THREE TABLETS in 24 hours        Assessment and Plan:        Health Maintenance   Topic Date Due    Hepatitis C Screening  Never done    TETANUS VACCINE  Never done    Mammogram  Never done    Lipid Panel  05/31/2023    DEXA Scan  04/13/2025                  Isabella Triplett

## 2022-09-15 RX ORDER — IBUPROFEN 800 MG/1
800 TABLET ORAL EVERY 8 HOURS PRN
COMMUNITY
Start: 2022-05-05 | End: 2022-09-19

## 2022-09-19 ENCOUNTER — OFFICE VISIT (OUTPATIENT)
Dept: INTERNAL MEDICINE | Facility: CLINIC | Age: 72
End: 2022-09-19
Payer: COMMERCIAL

## 2022-09-19 VITALS
SYSTOLIC BLOOD PRESSURE: 131 MMHG | OXYGEN SATURATION: 100 % | WEIGHT: 99.63 LBS | RESPIRATION RATE: 18 BRPM | DIASTOLIC BLOOD PRESSURE: 79 MMHG | HEIGHT: 59 IN | TEMPERATURE: 98 F | BODY MASS INDEX: 20.08 KG/M2 | HEART RATE: 66 BPM

## 2022-09-19 DIAGNOSIS — Z76.0 MEDICATION REFILL: Primary | ICD-10-CM

## 2022-09-19 DIAGNOSIS — E55.9 VITAMIN D DEFICIENCY: ICD-10-CM

## 2022-09-19 DIAGNOSIS — E03.8 OTHER SPECIFIED HYPOTHYROIDISM: ICD-10-CM

## 2022-09-19 DIAGNOSIS — I10 PRIMARY HYPERTENSION: ICD-10-CM

## 2022-09-19 DIAGNOSIS — G43.909 MIGRAINE WITHOUT STATUS MIGRAINOSUS, NOT INTRACTABLE, UNSPECIFIED MIGRAINE TYPE: ICD-10-CM

## 2022-09-19 DIAGNOSIS — Z95.1 HX OF CABG: ICD-10-CM

## 2022-09-19 DIAGNOSIS — M81.0 OSTEOPOROSIS, UNSPECIFIED OSTEOPOROSIS TYPE, UNSPECIFIED PATHOLOGICAL FRACTURE PRESENCE: ICD-10-CM

## 2022-09-19 PROBLEM — I48.91 ATRIAL FIBRILLATION: Status: ACTIVE | Noted: 2022-09-19

## 2022-09-19 PROBLEM — Q85.00 NEUROFIBROMATOSIS: Status: ACTIVE | Noted: 2022-09-19

## 2022-09-19 PROCEDURE — 99213 OFFICE O/P EST LOW 20 MIN: CPT | Mod: PBBFAC

## 2022-09-19 RX ORDER — ALENDRONATE SODIUM 70 MG/1
70 TABLET ORAL
Qty: 12 TABLET | Refills: 3 | Status: SHIPPED | OUTPATIENT
Start: 2022-09-19 | End: 2023-01-11 | Stop reason: SDUPTHER

## 2022-09-19 RX ORDER — METOPROLOL SUCCINATE 100 MG/1
100 TABLET, EXTENDED RELEASE ORAL DAILY
Qty: 90 TABLET | Refills: 3 | Status: SHIPPED | OUTPATIENT
Start: 2022-09-19 | End: 2023-05-30 | Stop reason: SDUPTHER

## 2022-09-19 RX ORDER — ATORVASTATIN CALCIUM 80 MG/1
80 TABLET, FILM COATED ORAL DAILY
Qty: 90 TABLET | Refills: 3 | Status: SHIPPED | OUTPATIENT
Start: 2022-09-19 | End: 2023-05-30 | Stop reason: SDUPTHER

## 2022-09-19 RX ORDER — LISINOPRIL 10 MG/1
10 TABLET ORAL DAILY
Qty: 90 TABLET | Refills: 3 | Status: SHIPPED | OUTPATIENT
Start: 2022-09-19 | End: 2023-01-11 | Stop reason: SDUPTHER

## 2022-09-19 RX ORDER — NAPROXEN 500 MG/1
500 TABLET ORAL 2 TIMES DAILY
Qty: 30 TABLET | Refills: 3 | Status: SHIPPED | OUTPATIENT
Start: 2022-09-19 | End: 2023-09-19

## 2022-09-19 NOTE — PROGRESS NOTES
"[facility] INTERNAL MEDICINE  OUTPATIENT OFFICE VISIT NOTE    SUBJECTIVE:      HPI: Luz Elena Saunders is a 71 y.o. female w/ PMH of CAD s/p CABG x3 (LIMA- LAD, SVG to OM, SVG to RCA in May 2018), HTN, HLD, hypothyroid, migraine, neurofibromatosis type II, vit D deficiency, and osteoporosis. Presents today for f/u. Last seen 4/2022. Prev had LV thrombus on xarelto.      Saw cardiology 6/2022; stopped Xarelto and started Imdur   Pending echo and stress test   Dexa from 4/2022 with osteoporosis   Lab work from April 2022 with low vitamin D   Denies all vaccines  Denies mammogram  States she has Cologuard at home, encouraged to complete  Refilled medications     Lives with boyfriend  Denies tobacco alcohol or drug use     Echocardiogram from 1/27/2021:  Normal LV size and function  LVEF 55%  Mild MR  Mild AV sclerosis  Mild TR, RVSP 35  Moderate dilated left atrium  Known PFO with left-to-right shunt  IVC dilated     Holter monitor: 2/10/2021:   Sinus rhythm with average heart rate 73 bpm, no episodes of tachycardia, no episodes of bradycardia  Short burst of SVT was 5 beats at 122 bpm  Longest V beat was 3 beats at 97 bpm   No diary returned    ROS:  (+)  (-) Chest pain, palpitations, SOB, dizziness, syncope, edema, PND, orthopnea, claudication, cough, fever, chills, abdominal pain, vomiting, diarrhea, dysuria, bleeding    OBJECTIVE:     Vital signs:   /79 (BP Location: Left arm, Patient Position: Sitting, BP Method: Small (Automatic))   Pulse 66   Temp 98.2 °F (36.8 °C) (Oral)   Resp 18   Ht 4' 11.06" (1.5 m)   Wt 45.2 kg (99 lb 10.4 oz)   SpO2 100%   BMI 20.09 kg/m²      Physical Examination:  General:  Well-nourished, well-developed, no acute distress  HEENT: Normocephalic, atraumatic. EOMI.  MMM  Neck: Supple. No obvious JVD.  Normal range of motion.  Respiratory: CTAB.  No obvious crackles wheeze or rhonchi.  Cardiovascular:  RRR.  No obvious M/G/R. Warm extremities.  Peripheral pulses intact.  No " edema.  Gastrointestinal:  Soft, nontender, nondistended.  Normal bowel sounds.  Neurologic: ANOx3.  Answering questions/following commands appropriately.  No FND      Current Outpatient Medications:     aspirin (ECOTRIN) 81 MG EC tablet, Take 81 mg by mouth., Disp: , Rfl:     isosorbide mononitrate (IMDUR) 30 MG 24 hr tablet, Take 1 tablet (30 mg total) by mouth once daily., Disp: 30 tablet, Rfl: 11    levothyroxine (SYNTHROID) 88 MCG tablet, Take 88 mcg by mouth once daily., Disp: , Rfl:     paroxetine (PAXIL) 20 MG tablet, Take 20 mg by mouth., Disp: , Rfl:     rizatriptan (MAXALT) 10 MG tablet, TAKE ONE tablet by MOUTH as needed FOR MIGRAINE. MAY REPEAT in TWO hours IF needed. DO not exceed THREE TABLETS in 24 hours, Disp: , Rfl:     alendronate (FOSAMAX) 70 MG tablet, Take 1 tablet (70 mg total) by mouth every 7 days. 1 tablet per week, Disp: 12 tablet, Rfl: 3    atorvastatin (LIPITOR) 80 MG tablet, Take 1 tablet (80 mg total) by mouth once daily., Disp: 90 tablet, Rfl: 3    lisinopriL 10 MG tablet, Take 1 tablet (10 mg total) by mouth once daily., Disp: 90 tablet, Rfl: 3    metoprolol succinate (TOPROL-XL) 100 MG 24 hr tablet, Take 1 tablet (100 mg total) by mouth once daily., Disp: 90 tablet, Rfl: 3    naproxen (NAPROSYN) 500 MG tablet, Take 1 tablet (500 mg total) by mouth 2 (two) times daily., Disp: 30 tablet, Rfl: 3    Current Facility-Administered Medications:     nitroGLYCERIN SL tablet 0.4 mg, 0.4 mg, Sublingual, 1 time in Clinic/HOD, Rodney Tucker MD     ASSESSMENT & PLAN:     CAD s/p CABG x3-2018  HTN  HLD  -Follows with cardiology, continue  -no longer on xarelto per cardiology for LV thrombus   -Continue aspirin 81, Lipitor 80, lisinopril 10, Toprol 100, PRN SL nitro, Imdur 30   -pending echo and stress test per cardiology next month   -No acute symptoms or complaints today     Osteoporosis   Vit D def  -repleted with 50K weekly in past   -start daily calcium and vit d   -started Alendronate 70  mg weekly  -advised dentist eval      Hypothyroidism  -Continue Synthroid 88     Migraines  -Continue tylenol/naproxen and rizatriptan as needed  -not used triptan in the past > 3 months       Neurofibromatosis type II  -Previously evaluated by specialist in [place], per patient  -No further treatment     DEXA scan-4/2022  Breast cancer screening-denies mammogram  Gynecology screening-denies referral  Colon cancer screening-states she has Cologuard at home, encouraged to complete  Vaccines-denies all vaccines    Guzman Welch MD

## 2022-09-19 NOTE — PROGRESS NOTES
I have reviewed the notes, assessments, and management plan performed by resident, I concur with her/his documentation of Luz Elena Saunders.

## 2022-10-12 ENCOUNTER — HOSPITAL ENCOUNTER (OUTPATIENT)
Dept: RADIOLOGY | Facility: HOSPITAL | Age: 72
Discharge: HOME | End: 2022-10-12
Attending: INTERNAL MEDICINE
Payer: COMMERCIAL

## 2022-10-12 ENCOUNTER — HOSPITAL ENCOUNTER (OUTPATIENT)
Dept: CARDIOLOGY | Facility: HOSPITAL | Age: 72
Discharge: HOME | End: 2022-10-12
Attending: INTERNAL MEDICINE
Payer: COMMERCIAL

## 2022-10-12 DIAGNOSIS — R06.02 SOB (SHORTNESS OF BREATH) ON EXERTION: ICD-10-CM

## 2022-10-12 DIAGNOSIS — I25.10 CAD IN NATIVE ARTERY: ICD-10-CM

## 2022-10-12 DIAGNOSIS — Z95.1 HX OF CABG: ICD-10-CM

## 2022-10-12 DIAGNOSIS — I25.110 ATHEROSCLEROSIS OF NATIVE CORONARY ARTERY OF NATIVE HEART WITH UNSTABLE ANGINA PECTORIS: ICD-10-CM

## 2022-10-12 PROCEDURE — A9502 TC99M TETROFOSMIN: HCPCS

## 2022-10-12 PROCEDURE — 63600175 PHARM REV CODE 636 W HCPCS

## 2022-10-12 RX ORDER — REGADENOSON 0.08 MG/ML
INJECTION, SOLUTION INTRAVENOUS
Status: COMPLETED
Start: 2022-10-12 | End: 2022-10-12

## 2022-10-12 RX ADMIN — REGADENOSON 0.4 MG: 0.08 INJECTION, SOLUTION INTRAVENOUS at 08:10

## 2022-10-13 LAB
CV STRESS BASE HR: 70 BPM
DIASTOLIC BLOOD PRESSURE: 88 MMHG
NUC REST DIASTOLIC VOLUME INDEX: 68
NUC REST EJECTION FRACTION: 77
NUC REST SYSTOLIC VOLUME INDEX: 19
NUC STRESS DIASTOLIC VOLUME INDEX: 60
NUC STRESS EJECTION FRACTION: 72 %
NUC STRESS SYSTOLIC VOLUME INDEX: 14
OHS CV CPX 85 PERCENT MAX PREDICTED HEART RATE MALE: 121
OHS CV CPX ESTIMATED METS: 2
OHS CV CPX MAX PREDICTED HEART RATE: 143
OHS CV CPX PATIENT IS FEMALE: 1
OHS CV CPX PATIENT IS MALE: 0
OHS CV CPX PEAK DIASTOLIC BLOOD PRESSURE: 88 MMHG
OHS CV CPX PEAK HEAR RATE: 90 BPM
OHS CV CPX PEAK RATE PRESSURE PRODUCT: NORMAL
OHS CV CPX PEAK SYSTOLIC BLOOD PRESSURE: 164 MMHG
OHS CV CPX PERCENT MAX PREDICTED HEART RATE ACHIEVED: 63
OHS CV CPX RATE PRESSURE PRODUCT PRESENTING: NORMAL
STRESS ECHO POST EXERCISE DUR MIN: 0 MINUTES
STRESS ECHO POST EXERCISE DUR SEC: 0 SECONDS
SYSTOLIC BLOOD PRESSURE: 164 MMHG

## 2022-10-31 ENCOUNTER — OFFICE VISIT (OUTPATIENT)
Dept: CARDIOLOGY | Facility: CLINIC | Age: 72
End: 2022-10-31
Payer: COMMERCIAL

## 2022-10-31 VITALS
DIASTOLIC BLOOD PRESSURE: 78 MMHG | OXYGEN SATURATION: 100 % | WEIGHT: 98.63 LBS | TEMPERATURE: 98 F | HEIGHT: 59 IN | RESPIRATION RATE: 20 BRPM | BODY MASS INDEX: 19.88 KG/M2 | SYSTOLIC BLOOD PRESSURE: 132 MMHG | HEART RATE: 65 BPM

## 2022-10-31 DIAGNOSIS — E03.8 OTHER SPECIFIED HYPOTHYROIDISM: ICD-10-CM

## 2022-10-31 DIAGNOSIS — I48.0 PAROXYSMAL ATRIAL FIBRILLATION: ICD-10-CM

## 2022-10-31 DIAGNOSIS — I10 PRIMARY HYPERTENSION: ICD-10-CM

## 2022-10-31 DIAGNOSIS — Q85.00 NEUROFIBROMATOSIS: ICD-10-CM

## 2022-10-31 DIAGNOSIS — Z95.1 HX OF CABG: Primary | ICD-10-CM

## 2022-10-31 PROBLEM — I25.110 ATHEROSCLEROSIS OF NATIVE CORONARY ARTERY OF NATIVE HEART WITH UNSTABLE ANGINA PECTORIS: Status: RESOLVED | Noted: 2022-06-09 | Resolved: 2022-10-31

## 2022-10-31 PROBLEM — R06.02 SOB (SHORTNESS OF BREATH) ON EXERTION: Status: RESOLVED | Noted: 2022-06-09 | Resolved: 2022-10-31

## 2022-10-31 PROCEDURE — 99214 OFFICE O/P EST MOD 30 MIN: CPT | Mod: PBBFAC | Performed by: INTERNAL MEDICINE

## 2022-10-31 RX ORDER — NITROGLYCERIN 0.4 MG/1
0.4 TABLET SUBLINGUAL EVERY 5 MIN PRN
Qty: 30 TABLET | Refills: 3 | Status: SHIPPED | OUTPATIENT
Start: 2022-10-31 | End: 2024-03-04 | Stop reason: SDUPTHER

## 2022-10-31 RX ORDER — ACETAMINOPHEN 500 MG
5000 TABLET ORAL DAILY
COMMUNITY
End: 2024-03-04 | Stop reason: SDUPTHER

## 2022-10-31 NOTE — PROGRESS NOTES
Chief Complaint   Patient presents with    f/u sts had some chest discomfort on right side this natalia Saunders is a 71 y.o. female with PMH CAD (s/p CABGx3 May 2018- LIMA-LAD, SVG- OM and SVG -RCA), a fib (s/p modified cryo MAZE ablation and TOBI ligation at time of CABG), HTN, hypothyroidism, migraines, neurofibromatosis, osteoporosis presenting for follow up appointment.     During last visit, pt reported falling from the porch and subsequently developing right sided chest pain and dyspnea. A nuclear stress test and echo were ordered. Imdur was added. Pt did not get the echo done but nuclear test revealed a mild intensity, moderate sized mostly reversible defect in Lcx territory and small moderate reversible apical defect.  During last visit, pt also complained of easy bruising with xarelto and asked about stopping it. Long discussion was held with pt and she understood there may be a small risk of undiagnosed Afib that may potentially cause a CVA. Xarelto was stopped.  Today, pt reports feeling well with only occasional localized right sided chest pain that she thinks is related to her fall. It is not related to exertion. She denies dyspnea on exertion. She has not noticed a difference with imdur.      Cardiac testing:      Results for orders placed during the hospital encounter of 10/12/22    Nuclear Stress - Cardiology Interpreted    Interpretation Summary    Abnormal myocardial perfusion scan.    There are two significant perfusion abnormalities.    Perfusion Abnormality #1 - There is a mild intensity, moderate sized, mostly reversible perfusion abnormality with some fixed areas in the basal to distal anterolateral wall(s) in the typical distribution of the LCX territory.    Perfusion Abnormality #2 - There is a small sized, moderate intensity, reversible perfusion abnormality that is consistent with ischemia in the distal apex wall(s) in the typical distribution of the LAD territory.    There  are no other significant perfusion abnormalities.    The gated perfusion images showed an ejection fraction of 77% at rest. The gated perfusion images showed an ejection fraction of 72% post stress.    The EKG portion of this study is negative for ischemia.    The patient reported no chest pain during the stress test.    There were no arrhythmias during stress.      Echocardiogram from 1/27/2021:  Normal LV size and function  LVEF 55%  Mild MR  Mild AV sclerosis  Mild TR, RVSP 35  Moderate dilated left atrium  Known PFO with left-to-right shunt  IVC dilated     48-hour holter monitor: 2/10/2021:   Sinus rhythm with average heart rate 73 bpm, no episodes of tachycardia, no episodes of bradycardia  Short burst of SVT was 5 beats at 122 bpm  Longest V beat was 3 beats at 97 bpm   No diary returned    Constitutional: negative for fever,chills, sweats, weakness, fatigue, decreased activity   Eye: negative for blurry vision, vision change  ENMT: negative for sore throat, nasal congestion  Respiratory: negative for shortness of breath, cough, wheezing  Cardiovascular: atypical chest pain, negative for dyspnea on exertion, orthopnea, PND, lower extremity edema, palpitations, claudication  Gastrointestinal: negative for nausea, vomiting, abdominal pain, constipation, diarrhea, blood in stool  Genitourinary: negative for hematuria, nocturia, dysuria  Endocrine: negative for abnormal thirst, temperature  Musculoskeletal: negative for back pain, joint pain  Integumentary: negative for abnormal mole  Neurologic: negative for weakness, numbness, headaches, shooting pain  Hematology: easy bruising, negative for  easy bleeding  Allergy: negative for watery eyes, sneezing  Psychiatric: negative for loss of interest, anxiety, stress      Vitals:    10/31/22 0855   BP: (!) 148/84   Pulse: 65   Resp: 20   Temp: 97.9 °F (36.6 °C)       General: alert and oriented/no acute distress  Eye: EOMI/normal conjunctiva/no xanthelasma  HENT:  normocephalic/moist oral mucosa  Neck: supple/nontender/no carotid bruit  Respiratory: lungs CTA/nonlabored respirations/BS equal/symmetrical expansion/no  chest wall tenderness  Cardiovascular: normal rate/normal rhythm/no murmur/normal peripheral perfusion/no  edema/no JVD  Gastrointestinal: soft/nontender  Musculoskeletal: normal ROM  Integumentary: warm/dry/pink/intact  Neurologic: alert/oriented/normal sensory/no focal deficits  Psychiatric: cooperative/appropriate mood and affect/normal judgment      Current Outpatient Medications:     aspirin (ECOTRIN) 81 MG EC tablet, Take 81 mg by mouth., Disp: , Rfl:     atorvastatin (LIPITOR) 80 MG tablet, Take 1 tablet (80 mg total) by mouth once daily., Disp: 90 tablet, Rfl: 3    cholecalciferol, vitamin D3, (VITAMIN D3) 125 mcg (5,000 unit) Tab, Take 5,000 Units by mouth once daily., Disp: , Rfl:     isosorbide mononitrate (IMDUR) 30 MG 24 hr tablet, Take 1 tablet (30 mg total) by mouth once daily., Disp: 30 tablet, Rfl: 11    levothyroxine (SYNTHROID) 88 MCG tablet, Take 88 mcg by mouth once daily., Disp: , Rfl:     lisinopriL 10 MG tablet, Take 1 tablet (10 mg total) by mouth once daily., Disp: 90 tablet, Rfl: 3    metoprolol succinate (TOPROL-XL) 100 MG 24 hr tablet, Take 1 tablet (100 mg total) by mouth once daily., Disp: 90 tablet, Rfl: 3    naproxen (NAPROSYN) 500 MG tablet, Take 1 tablet (500 mg total) by mouth 2 (two) times daily., Disp: 30 tablet, Rfl: 3    rizatriptan (MAXALT) 10 MG tablet, TAKE ONE tablet by MOUTH as needed FOR MIGRAINE. MAY REPEAT in TWO hours IF needed. DO not exceed THREE TABLETS in 24 hours, Disp: , Rfl:     alendronate (FOSAMAX) 70 MG tablet, Take 1 tablet (70 mg total) by mouth every 7 days. 1 tablet per week (Patient not taking: Reported on 10/31/2022), Disp: 12 tablet, Rfl: 3    paroxetine (PAXIL) 20 MG tablet, Take 20 mg by mouth., Disp: , Rfl:         Assessment/Plan    CAD (s/p CABGx3 05/18)  LIMA-LAD, SVG-OM and SVG-RCA  CABG  done in the setting of abnormal stress test, pt does not recall having chest pain before that.  During last visit, pt complained of chest pain that she thinks is related to a recent fall. Stress test shows a mild intensity, medium sized mostly reversible Lcx defect and a small, moderate intensity reversible apical LAD defect  As her pain appears non-anginal and defects are not in large territories, will continue to monitor.  Continue asa, lipitor, toprol, imdur, SL NTG      Afib  Pt with hx of afib. She is s/p modified cryo-MAZE and TOBI ligation in 2018 at time of CABG  During last visit, pt complained of easy bruising and asked if she could stop Xarelto.    Patient has not had evidence of AFib since his surgery in 2018.  Long discussion was held with pt and she understood there may be a small risk of undiagnosed Afib that may potentially cause a CVA. Xarelto was stopped.  Explained to the patient today that it AFib could recur and be asymptomatic and explained that she could still have an increased risk of stroke of vision palpitation related to undiagnosed AFib.    We will obtain a 30 day monitor.  If AFib is evident, would restart Xarelto.  If not consider keeping her off.  Continue Toprol 100 mg daily      HTN  Initial /84 with repeat 132/78  Will continue to monitor  Continue lisinopril 10mg, toprol 100mg, imdur    HLP  Last LDL on file in 2018, LDL at goal at 62  Continue high intensity statin  Pt to repeat labs with PCP    Hypothyroidism  Continue levothyroxine    30 day event monitor  4 m F/U

## 2022-11-09 ENCOUNTER — HOSPITAL ENCOUNTER (OUTPATIENT)
Dept: CARDIOLOGY | Facility: HOSPITAL | Age: 72
Discharge: HOME | End: 2022-11-09
Attending: INTERNAL MEDICINE
Payer: COMMERCIAL

## 2022-11-09 DIAGNOSIS — I48.0 PAROXYSMAL ATRIAL FIBRILLATION: ICD-10-CM

## 2022-11-09 PROCEDURE — 93270 REMOTE 30 DAY ECG REV/REPORT: CPT

## 2022-11-15 ENCOUNTER — TELEPHONE (OUTPATIENT)
Dept: CARDIOLOGY | Facility: CLINIC | Age: 72
End: 2022-11-15
Payer: COMMERCIAL

## 2022-11-15 NOTE — TELEPHONE ENCOUNTER
Pt called stating that due to the tape irritating her on the 30 day event mtr she was going to return it. Spoke with pt informing her to contact [phone] associated with monitor to see if they could supply a different form of tape instead of her returning it at this time. Pt stated she would do that. Instructed pt if they cannot to give us a call back for further recommendations

## 2023-01-11 ENCOUNTER — OFFICE VISIT (OUTPATIENT)
Dept: INTERNAL MEDICINE | Facility: CLINIC | Age: 73
End: 2023-01-11
Payer: COMMERCIAL

## 2023-01-11 VITALS
HEIGHT: 59 IN | TEMPERATURE: 98 F | RESPIRATION RATE: 20 BRPM | OXYGEN SATURATION: 99 % | SYSTOLIC BLOOD PRESSURE: 137 MMHG | HEART RATE: 63 BPM | WEIGHT: 100.38 LBS | DIASTOLIC BLOOD PRESSURE: 62 MMHG | BODY MASS INDEX: 20.24 KG/M2

## 2023-01-11 DIAGNOSIS — E03.8 OTHER SPECIFIED HYPOTHYROIDISM: ICD-10-CM

## 2023-01-11 DIAGNOSIS — Z76.0 MEDICATION REFILL: ICD-10-CM

## 2023-01-11 DIAGNOSIS — I10 PRIMARY HYPERTENSION: Primary | ICD-10-CM

## 2023-01-11 DIAGNOSIS — Z01.89 ROUTINE LAB DRAW: ICD-10-CM

## 2023-01-11 DIAGNOSIS — E78.5 HYPERLIPIDEMIA, UNSPECIFIED HYPERLIPIDEMIA TYPE: ICD-10-CM

## 2023-01-11 DIAGNOSIS — Z95.1 HX OF CABG: ICD-10-CM

## 2023-01-11 DIAGNOSIS — E55.9 VITAMIN D DEFICIENCY: ICD-10-CM

## 2023-01-11 DIAGNOSIS — M81.0 OSTEOPOROSIS, UNSPECIFIED OSTEOPOROSIS TYPE, UNSPECIFIED PATHOLOGICAL FRACTURE PRESENCE: ICD-10-CM

## 2023-01-11 DIAGNOSIS — G43.909 MIGRAINE WITHOUT STATUS MIGRAINOSUS, NOT INTRACTABLE, UNSPECIFIED MIGRAINE TYPE: ICD-10-CM

## 2023-01-11 DIAGNOSIS — Z23 NEED FOR VACCINE FOR TD (TETANUS-DIPHTHERIA): ICD-10-CM

## 2023-01-11 PROCEDURE — 90471 IMMUNIZATION ADMIN: CPT | Mod: PBBFAC

## 2023-01-11 PROCEDURE — 99214 OFFICE O/P EST MOD 30 MIN: CPT | Mod: PBBFAC,25

## 2023-01-11 PROCEDURE — 90715 TDAP VACCINE 7 YRS/> IM: CPT | Mod: PBBFAC

## 2023-01-11 RX ORDER — LEVOTHYROXINE SODIUM 88 UG/1
88 TABLET ORAL
Qty: 30 TABLET | Refills: 6 | Status: SHIPPED | OUTPATIENT
Start: 2023-01-11 | End: 2023-05-30 | Stop reason: SDUPTHER

## 2023-01-11 RX ORDER — PAROXETINE HYDROCHLORIDE 20 MG/1
20 TABLET, FILM COATED ORAL DAILY
Qty: 90 TABLET | Refills: 2 | Status: SHIPPED | OUTPATIENT
Start: 2023-01-11 | End: 2023-05-30 | Stop reason: SDUPTHER

## 2023-01-11 RX ORDER — LISINOPRIL 20 MG/1
20 TABLET ORAL DAILY
Qty: 90 TABLET | Refills: 2 | Status: SHIPPED | OUTPATIENT
Start: 2023-01-11 | End: 2023-05-30 | Stop reason: SDUPTHER

## 2023-01-11 RX ORDER — ALENDRONATE SODIUM 70 MG/1
70 TABLET ORAL
Qty: 12 TABLET | Refills: 3 | Status: SHIPPED | OUTPATIENT
Start: 2023-01-11 | End: 2023-05-30 | Stop reason: SDUPTHER

## 2023-01-11 NOTE — PROGRESS NOTES
[facility] INTERNAL MEDICINE  OUTPATIENT OFFICE VISIT NOTE    SUBJECTIVE:      HPI: Luz Elena Saunders is a 72 y.o. female w/ PMH of CAD s/p CABG x3 (LIMA- LAD, SVG to OM, SVG to RCA in May 2018), HTN, HLD, hypothyroid, migraine, neurofibromatosis type II, vit D deficiency, and osteoporosis. Presents today for f/u.     Follows cardiology   Needs refills   Labs today   Tetanus today   Denies mammogram  States she has Cologuard at home, encouraged to complete  Refilled medications  Increasing lisinopril     Lives with boyfriend  Denies tobacco alcohol or drug use     Echocardiogram from 1/27/2021:  Normal LV size and function  LVEF 55%  Mild MR  Mild AV sclerosis  Mild TR, RVSP 35  Moderate dilated left atrium  Known PFO with left-to-right shunt  IVC dilated     Holter monitor: 2/10/2021:   Sinus rhythm with average heart rate 73 bpm, no episodes of tachycardia, no episodes of bradycardia  Short burst of SVT was 5 beats at 122 bpm  Longest V beat was 3 beats at 97 bpm   No diary returned    Nuclear stress: 10/2022:  Conclusion         Abnormal myocardial perfusion scan.    There are two significant perfusion abnormalities.    Perfusion Abnormality #1 - There is a mild intensity, moderate sized, mostly reversible perfusion abnormality with some fixed areas in the basal to distal anterolateral wall(s) in the typical distribution of the LCX territory.    Perfusion Abnormality #2 - There is a small sized, moderate intensity, reversible perfusion abnormality that is consistent with ischemia in the distal apex wall(s) in the typical distribution of the LAD territory.    There are no other significant perfusion abnormalities.    The gated perfusion images showed an ejection fraction of 77% at rest. The gated perfusion images showed an ejection fraction of 72% post stress.    The EKG portion of this study is negative for ischemia.    The patient reported no chest pain during the stress test.    There were no arrhythmias during  "stress.    ROS:  (+)  (-) Chest pain, palpitations, SOB, dizziness, syncope, edema, orthopnea, cough, fever, chills, abdominal pain, vomiting, diarrhea, dysuria, bleeding    OBJECTIVE:     Vital signs:   /62 (BP Location: Right arm, Patient Position: Sitting, BP Method: Medium (Automatic))   Pulse 63   Temp 98.4 °F (36.9 °C) (Oral)   Resp 20   Ht 4' 11.02" (1.499 m)   Wt 45.5 kg (100 lb 6.4 oz)   SpO2 99%   BMI 20.27 kg/m²      Physical Examination:  General:  Well-nourished, well-developed, no acute distress  HEENT: Normocephalic, atraumatic. EOMI.  MMM  Neck: Supple. No obvious JVD.  Normal range of motion.  Respiratory: CTAB.  No obvious crackles wheeze or rhonchi.  Cardiovascular:  RRR.  No obvious M/G/R. Warm extremities.  Peripheral pulses intact.  No edema.  Gastrointestinal:  Soft, nontender, nondistended.  Normal bowel sounds.  Neurologic: ANOx3.  Answering questions/following commands appropriately.  No FND      Current Outpatient Medications:     aspirin (ECOTRIN) 81 MG EC tablet, Take 81 mg by mouth., Disp: , Rfl:     atorvastatin (LIPITOR) 80 MG tablet, Take 1 tablet (80 mg total) by mouth once daily., Disp: 90 tablet, Rfl: 3    cholecalciferol, vitamin D3, 125 mcg (5,000 unit) Tab, Take 5,000 Units by mouth once daily., Disp: , Rfl:     isosorbide mononitrate (IMDUR) 30 MG 24 hr tablet, Take 1 tablet (30 mg total) by mouth once daily., Disp: 30 tablet, Rfl: 11    metoprolol succinate (TOPROL-XL) 100 MG 24 hr tablet, Take 1 tablet (100 mg total) by mouth once daily., Disp: 90 tablet, Rfl: 3    naproxen (NAPROSYN) 500 MG tablet, Take 1 tablet (500 mg total) by mouth 2 (two) times daily., Disp: 30 tablet, Rfl: 3    nitroGLYCERIN (NITROSTAT) 0.4 MG SL tablet, Place 1 tablet (0.4 mg total) under the tongue every 5 (five) minutes as needed for Chest pain., Disp: 30 tablet, Rfl: 3    rizatriptan (MAXALT) 10 MG tablet, TAKE ONE tablet by MOUTH as needed FOR MIGRAINE. MAY REPEAT in TWO hours IF " needed. DO not exceed THREE TABLETS in 24 hours, Disp: , Rfl:     alendronate (FOSAMAX) 70 MG tablet, Take 1 tablet (70 mg total) by mouth every 7 days. 1 tablet per week, Disp: 12 tablet, Rfl: 3    levothyroxine (SYNTHROID) 88 MCG tablet, Take 1 tablet (88 mcg total) by mouth before breakfast., Disp: 30 tablet, Rfl: 6    lisinopriL (PRINIVIL,ZESTRIL) 20 MG tablet, Take 1 tablet (20 mg total) by mouth once daily., Disp: 90 tablet, Rfl: 2    paroxetine (PAXIL) 20 MG tablet, Take 1 tablet (20 mg total) by mouth once daily., Disp: 90 tablet, Rfl: 2  No current facility-administered medications for this visit.     ASSESSMENT & PLAN:     CAD s/p CABG x3-2018  HTN  HLD  -Follows with cardiology, continue  -no longer on xarelto per cardiology  -Continue aspirin 81, Lipitor 80, Toprol 100, PRN SL nitro, Imdur 30   -increase lisinopril to 20 mg  -No acute symptoms or complaints today     Afib   -s/p modified cryo-MAZE and TOBI ligation in 2018 at time of CABG  -off Xarelto     Osteoporosis   Vit D def  -repleted with 50K weekly in past   -start daily calcium and vit d   -refilled Alendronate 70 mg weekly  -advised dentist eval      Hypothyroidism  -Continue Synthroid 88  -repeat labs ordered     Migraines  -Continue tylenol/naproxen and rizatriptan as needed  -not used triptan in the past > 3 months       Neurofibromatosis type II  -Previously evaluated by specialist in [place], per patient  -No further treatment     DEXA scan-4/2022 with osteoporosis   Breast cancer screening-denies mammogram  Gynecology screening-denies referral; had hysterectomy 1985  Colon cancer screening-states she has Cologuard at home, encouraged to complete again   Vaccines-  COVID-19: Vaccinated x2  Influenza:  January 2022  PNA 13 + 23: in 2019/2020  Tetanus today     Guzman Welch MD

## 2023-03-28 ENCOUNTER — OFFICE VISIT (OUTPATIENT)
Dept: CARDIOLOGY | Facility: CLINIC | Age: 73
End: 2023-03-28
Payer: COMMERCIAL

## 2023-03-28 VITALS
RESPIRATION RATE: 18 BRPM | HEART RATE: 62 BPM | DIASTOLIC BLOOD PRESSURE: 77 MMHG | TEMPERATURE: 98 F | SYSTOLIC BLOOD PRESSURE: 124 MMHG | WEIGHT: 95.88 LBS | OXYGEN SATURATION: 100 % | HEIGHT: 59 IN | BODY MASS INDEX: 19.33 KG/M2

## 2023-03-28 DIAGNOSIS — E78.5 HYPERLIPIDEMIA, UNSPECIFIED HYPERLIPIDEMIA TYPE: ICD-10-CM

## 2023-03-28 DIAGNOSIS — R06.02 SOB (SHORTNESS OF BREATH) ON EXERTION: ICD-10-CM

## 2023-03-28 DIAGNOSIS — I48.0 PAF (PAROXYSMAL ATRIAL FIBRILLATION): ICD-10-CM

## 2023-03-28 DIAGNOSIS — I25.118 CORONARY ARTERY DISEASE OF NATIVE ARTERY OF NATIVE HEART WITH STABLE ANGINA PECTORIS: Primary | ICD-10-CM

## 2023-03-28 DIAGNOSIS — Z95.1 HX OF CABG: ICD-10-CM

## 2023-03-28 DIAGNOSIS — I25.110 ATHEROSCLEROSIS OF NATIVE CORONARY ARTERY OF NATIVE HEART WITH UNSTABLE ANGINA PECTORIS: ICD-10-CM

## 2023-03-28 DIAGNOSIS — I10 PRIMARY HYPERTENSION: ICD-10-CM

## 2023-03-28 DIAGNOSIS — I25.10 CAD IN NATIVE ARTERY: ICD-10-CM

## 2023-03-28 PROCEDURE — 3078F DIAST BP <80 MM HG: CPT | Mod: CPTII,,, | Performed by: NURSE PRACTITIONER

## 2023-03-28 PROCEDURE — 3074F SYST BP LT 130 MM HG: CPT | Mod: CPTII,,, | Performed by: NURSE PRACTITIONER

## 2023-03-28 PROCEDURE — 1160F PR REVIEW ALL MEDS BY PRESCRIBER/CLIN PHARMACIST DOCUMENTED: ICD-10-PCS | Mod: CPTII,,, | Performed by: NURSE PRACTITIONER

## 2023-03-28 PROCEDURE — 4010F ACE/ARB THERAPY RXD/TAKEN: CPT | Mod: CPTII,,, | Performed by: NURSE PRACTITIONER

## 2023-03-28 PROCEDURE — 3008F BODY MASS INDEX DOCD: CPT | Mod: CPTII,,, | Performed by: NURSE PRACTITIONER

## 2023-03-28 PROCEDURE — 1101F PT FALLS ASSESS-DOCD LE1/YR: CPT | Mod: CPTII,,, | Performed by: NURSE PRACTITIONER

## 2023-03-28 PROCEDURE — 4010F PR ACE/ARB THEARPY RXD/TAKEN: ICD-10-PCS | Mod: CPTII,,, | Performed by: NURSE PRACTITIONER

## 2023-03-28 PROCEDURE — 3008F PR BODY MASS INDEX (BMI) DOCUMENTED: ICD-10-PCS | Mod: CPTII,,, | Performed by: NURSE PRACTITIONER

## 2023-03-28 PROCEDURE — 99213 OFFICE O/P EST LOW 20 MIN: CPT | Mod: S$PBB,,, | Performed by: NURSE PRACTITIONER

## 2023-03-28 PROCEDURE — 3074F PR MOST RECENT SYSTOLIC BLOOD PRESSURE < 130 MM HG: ICD-10-PCS | Mod: CPTII,,, | Performed by: NURSE PRACTITIONER

## 2023-03-28 PROCEDURE — 3078F PR MOST RECENT DIASTOLIC BLOOD PRESSURE < 80 MM HG: ICD-10-PCS | Mod: CPTII,,, | Performed by: NURSE PRACTITIONER

## 2023-03-28 PROCEDURE — 1159F MED LIST DOCD IN RCRD: CPT | Mod: CPTII,,, | Performed by: NURSE PRACTITIONER

## 2023-03-28 PROCEDURE — 1101F PR PT FALLS ASSESS DOC 0-1 FALLS W/OUT INJ PAST YR: ICD-10-PCS | Mod: CPTII,,, | Performed by: NURSE PRACTITIONER

## 2023-03-28 PROCEDURE — 3288F FALL RISK ASSESSMENT DOCD: CPT | Mod: CPTII,,, | Performed by: NURSE PRACTITIONER

## 2023-03-28 PROCEDURE — 1159F PR MEDICATION LIST DOCUMENTED IN MEDICAL RECORD: ICD-10-PCS | Mod: CPTII,,, | Performed by: NURSE PRACTITIONER

## 2023-03-28 PROCEDURE — 3288F PR FALLS RISK ASSESSMENT DOCUMENTED: ICD-10-PCS | Mod: CPTII,,, | Performed by: NURSE PRACTITIONER

## 2023-03-28 PROCEDURE — 1126F AMNT PAIN NOTED NONE PRSNT: CPT | Mod: CPTII,,, | Performed by: NURSE PRACTITIONER

## 2023-03-28 PROCEDURE — 1160F RVW MEDS BY RX/DR IN RCRD: CPT | Mod: CPTII,,, | Performed by: NURSE PRACTITIONER

## 2023-03-28 PROCEDURE — 99213 PR OFFICE/OUTPT VISIT, EST, LEVL III, 20-29 MIN: ICD-10-PCS | Mod: S$PBB,,, | Performed by: NURSE PRACTITIONER

## 2023-03-28 PROCEDURE — 1126F PR PAIN SEVERITY QUANTIFIED, NO PAIN PRESENT: ICD-10-PCS | Mod: CPTII,,, | Performed by: NURSE PRACTITIONER

## 2023-03-28 PROCEDURE — 99215 OFFICE O/P EST HI 40 MIN: CPT | Mod: PBBFAC | Performed by: NURSE PRACTITIONER

## 2023-03-28 RX ORDER — ISOSORBIDE MONONITRATE 30 MG/1
30 TABLET, EXTENDED RELEASE ORAL DAILY
Qty: 30 TABLET | Refills: 11 | Status: SHIPPED | OUTPATIENT
Start: 2023-03-28 | End: 2023-05-30

## 2023-03-28 NOTE — PROGRESS NOTES
CHIEF COMPLAINT:   Chief Complaint   Patient presents with    Follow-up     4 mos f/u w/30 day monitor. Sob w/ exertion when she has cp takes her nitro                                                  HPI:  Luz Elena Saunders 72 y.o. female with CAD (s/p CABG x 3 May 2018- LIMA-LAD, SVG- OM and SVG -RCA), Atrial Fibrillation (s/p modified cryo MAZE ablation and TOBI ligation at time of CABG), HTN, hypothyroidism, migraines, neurofibromatosis, osteoporosis presents for follow up and ongoing care.  Patient completed a nuclear stress test on 10.12.22 which revealed a mild intensity, moderate sized mostly reversible defect in Lcx territory and small moderate reversible apical defect.  Patient's provider at that time felt the chest pain was non-anginal and did not feel patient needed to undergo coronary angiogram at that time.      Patient presents today and states she does not have frequent episodes of chest pain.  She states her last episode was greater than 2 months ago and did require nitroglycerin.  She reports occasional shortness of breath with exertion, but states she is able to perform her ADLs.  She denies palpitations or near-syncope.  She reports compliance with her medications.  Of note, patient completed a 30 day event monitor November 2022, however the results are not available at this time.  After review results, we will plan to resume Xarelto if AFib was evident.                                                                                                                                                                                                                                                                                                                                                                                                                                                                                    Nuclear Stress Test 10.12.22  Abnormal myocardial perfusion scan.  There are two  significant perfusion abnormalities.  Perfusion Abnormality #1 - There is a mild intensity, moderate sized, mostly reversible perfusion abnormality with some fixed areas in the basal to distal anterolateral wall(s) in the typical distribution of the LCX territory.  Perfusion Abnormality #2 - There is a small sized, moderate intensity, reversible perfusion abnormality that is consistent with ischemia in the distal apex wall(s) in the typical distribution of the LAD territory.  There are no other significant perfusion abnormalities.  The gated perfusion images showed an ejection fraction of 77% at rest. The gated perfusion images showed an ejection fraction of 72% post stress.  The EKG portion of this study is negative for ischemia.  The patient reported no chest pain during the stress test.  There were no arrhythmias during stress.      Patient Active Problem List   Diagnosis    Hx of CABG    Primary hypertension    Other specified hypothyroidism    Neurofibromatosis    Migraine headache    Atrial fibrillation    Medication refill    Osteoporosis    Vitamin D deficiency    HLD (hyperlipidemia)     Past Surgical History:   Procedure Laterality Date    CORONARY ARTERY BYPASS GRAFT      HYSTERECTOMY       Social History     Socioeconomic History    Marital status:    Tobacco Use    Smoking status: Never    Smokeless tobacco: Never   Substance and Sexual Activity    Alcohol use: Not Currently     Alcohol/week: 1.0 standard drink     Types: 1 Cans of beer per week     Comment: monthly    Drug use: Never    Sexual activity: Not Currently     Partners: Male        Family History   Problem Relation Age of Onset    Stroke Mother     Heart disease Father      Review of patient's allergies indicates:   Allergen Reactions    Contrast media     Codeine Nausea And Vomiting    Dye Dermatitis     IV contrast    Tramadol Nausea And Vomiting         ROS:  Review of Systems   Constitutional: Negative.    Respiratory:  Positive  "for shortness of breath.    Cardiovascular:  Positive for chest pain.   Gastrointestinal: Negative.    Musculoskeletal: Negative.    Skin: Negative.    Neurological: Negative.    Psychiatric/Behavioral: Negative.                                                                                                                                                                                  PHYSICAL EXAM:  Visit Vitals  /77 (BP Location: Left arm, Patient Position: Sitting, BP Method: Medium (Automatic))   Pulse 62   Temp 97.9 °F (36.6 °C)   Resp 18   Ht 4' 10.66" (1.49 m)   Wt 43.5 kg (95 lb 14.4 oz)   SpO2 100%   BMI 19.59 kg/m²       Physical Exam  Constitutional:       Appearance: Normal appearance.   HENT:      Head: Normocephalic and atraumatic.   Eyes:      Extraocular Movements: Extraocular movements intact.      Pupils: Pupils are equal, round, and reactive to light.   Cardiovascular:      Rate and Rhythm: Normal rate and regular rhythm.   Pulmonary:      Effort: Pulmonary effort is normal.      Breath sounds: Normal breath sounds.   Abdominal:      Palpations: Abdomen is soft.   Musculoskeletal:         General: Normal range of motion.      Cervical back: Normal range of motion.   Skin:     General: Skin is warm and dry.   Neurological:      General: No focal deficit present.      Mental Status: She is alert and oriented to person, place, and time.   Psychiatric:         Mood and Affect: Mood normal.         Behavior: Behavior normal.       Current Outpatient Medications   Medication Instructions    alendronate (FOSAMAX) 70 mg, Oral, Every 7 days, 1 tablet per week    aspirin (ECOTRIN) 81 mg, Oral    atorvastatin (LIPITOR) 80 mg, Oral, Daily    cholecalciferol (vitamin D3) 5,000 Units, Oral, Daily    isosorbide mononitrate (IMDUR) 30 mg, Oral, Daily    levothyroxine (SYNTHROID) 88 mcg, Oral, Before breakfast    lisinopriL (PRINIVIL,ZESTRIL) 20 mg, Oral, Daily    metoprolol succinate (TOPROL-XL) 100 mg, " Oral, Daily    naproxen (NAPROSYN) 500 mg, Oral, 2 times daily    nitroGLYCERIN (NITROSTAT) 0.4 mg, Sublingual, Every 5 min PRN    paroxetine (PAXIL) 20 mg, Oral, Daily    rizatriptan (MAXALT) 10 MG tablet TAKE ONE tablet by MOUTH as needed FOR MIGRAINE. MAY REPEAT in TWO hours IF needed. DO not exceed THREE TABLETS in 24 hours        All medications, laboratory studies, cardiac diagnostic imaging reviewed.     Lab Results   Component Value Date    LDL 73.00 01/11/2023    LDL 62 05/31/2018    TRIG 57 01/11/2023    TRIG 144 05/31/2018    CREATININE 0.74 01/11/2023    MG 2.0 06/02/2018    K 4.1 01/11/2023        ASSESSMENT/PLAN:  CAD (s/p CABGx3 05/18)  LIMA-LAD, SVG-OM and SVG-RCA  Nuclear stress test showed a mild intensity, medium sized mostly reversible Lcx defect and a small, moderate intensity reversible apical LAD defect  Per previous note, patient's pain appears non-anginal and defects are not in large territories, will continue to monitor.  Patient states her last episode of chest pain was > 2 months ago and did not require SL Nitro  Continue Aspirin, Lipitor, Toprol, Imdur, and SL Nitro prn chest pain   We will continue to hold off on coronary angiogram at this time as patient has been asymptomatic for greater than 2 months  Patient instructed to present to the emergency room for any concerning cardiac symptoms - she was also instructed to notify our clinic if symptoms worsened and we will consider coronary angiogram at that time     Atrial Fibrillation   Patient is s/p modified cryo-MAZE and TOBI ligation in 2018 at time of CABG  During a previous visit, patient complained of easy bruising and asked if she could stop Xarelto.    Patient had not had evidence of AFib since his surgery in 2018.  Long discussion was held with pt and she understood there may be a small risk of undiagnosed Afib that may potentially cause a CVA. Xarelto was stopped.  30 day event monitor Nov. 2022 - results unavailable at this time  -  plans to restart Xarelto if Afib is evident - will call patient once results are received  Denies palpitations  Continue Toprol  mg daily     HTN  BP at goal 124/77  Continue Lisinopril, Toprol XL, and Imdur   Counseled on low salt diet     HLD  LDL near goal - 73  Continue high intensity statin     Hypothyroidism  Management per PCP     Follow up in Cardiology Clinic in 4 months  Follow up with PCP as directed

## 2023-05-30 ENCOUNTER — OFFICE VISIT (OUTPATIENT)
Dept: INTERNAL MEDICINE | Facility: CLINIC | Age: 73
End: 2023-05-30
Payer: COMMERCIAL

## 2023-05-30 VITALS
BODY MASS INDEX: 19.37 KG/M2 | WEIGHT: 94.81 LBS | HEART RATE: 65 BPM | OXYGEN SATURATION: 99 % | DIASTOLIC BLOOD PRESSURE: 71 MMHG | TEMPERATURE: 98 F | SYSTOLIC BLOOD PRESSURE: 113 MMHG | RESPIRATION RATE: 18 BRPM

## 2023-05-30 DIAGNOSIS — Z01.89 ROUTINE LAB DRAW: ICD-10-CM

## 2023-05-30 DIAGNOSIS — Z76.0 MEDICATION REFILL: ICD-10-CM

## 2023-05-30 DIAGNOSIS — I10 PRIMARY HYPERTENSION: Primary | ICD-10-CM

## 2023-05-30 DIAGNOSIS — E55.9 VITAMIN D DEFICIENCY: ICD-10-CM

## 2023-05-30 DIAGNOSIS — Z12.39 ENCOUNTER FOR SCREENING FOR MALIGNANT NEOPLASM OF BREAST, UNSPECIFIED SCREENING MODALITY: ICD-10-CM

## 2023-05-30 DIAGNOSIS — E03.8 OTHER SPECIFIED HYPOTHYROIDISM: ICD-10-CM

## 2023-05-30 DIAGNOSIS — Z95.1 HX OF CABG: ICD-10-CM

## 2023-05-30 DIAGNOSIS — M81.0 OSTEOPOROSIS, UNSPECIFIED OSTEOPOROSIS TYPE, UNSPECIFIED PATHOLOGICAL FRACTURE PRESENCE: ICD-10-CM

## 2023-05-30 DIAGNOSIS — Z23 NEED FOR VACCINE FOR TD (TETANUS-DIPHTHERIA): ICD-10-CM

## 2023-05-30 DIAGNOSIS — Z12.11 SCREEN FOR COLON CANCER: ICD-10-CM

## 2023-05-30 DIAGNOSIS — G43.909 MIGRAINE WITHOUT STATUS MIGRAINOSUS, NOT INTRACTABLE, UNSPECIFIED MIGRAINE TYPE: ICD-10-CM

## 2023-05-30 DIAGNOSIS — E78.5 HYPERLIPIDEMIA, UNSPECIFIED HYPERLIPIDEMIA TYPE: ICD-10-CM

## 2023-05-30 PROCEDURE — 99213 OFFICE O/P EST LOW 20 MIN: CPT | Mod: PBBFAC

## 2023-05-30 RX ORDER — LEVOTHYROXINE SODIUM 75 UG/1
75 TABLET ORAL
Qty: 90 TABLET | Refills: 3 | Status: SHIPPED | OUTPATIENT
Start: 2023-05-30 | End: 2024-03-04 | Stop reason: SDUPTHER

## 2023-05-30 RX ORDER — LISINOPRIL 20 MG/1
20 TABLET ORAL DAILY
Qty: 90 TABLET | Refills: 2 | Status: SHIPPED | OUTPATIENT
Start: 2023-05-30 | End: 2023-09-19 | Stop reason: SINTOL

## 2023-05-30 RX ORDER — ALENDRONATE SODIUM 70 MG/1
70 TABLET ORAL
Qty: 12 TABLET | Refills: 3 | Status: SHIPPED | OUTPATIENT
Start: 2023-05-30 | End: 2024-03-04 | Stop reason: SDUPTHER

## 2023-05-30 RX ORDER — ASPIRIN 81 MG/1
81 TABLET ORAL DAILY
Qty: 90 TABLET | Refills: 3 | Status: SHIPPED | OUTPATIENT
Start: 2023-05-30 | End: 2024-03-04 | Stop reason: SDUPTHER

## 2023-05-30 RX ORDER — METOPROLOL SUCCINATE 100 MG/1
100 TABLET, EXTENDED RELEASE ORAL DAILY
Qty: 90 TABLET | Refills: 3 | Status: SHIPPED | OUTPATIENT
Start: 2023-05-30 | End: 2024-03-04 | Stop reason: SDUPTHER

## 2023-05-30 RX ORDER — RIZATRIPTAN BENZOATE 10 MG/1
TABLET ORAL
Qty: 15 TABLET | Refills: 6 | Status: SHIPPED | OUTPATIENT
Start: 2023-05-30 | End: 2024-03-04 | Stop reason: SDUPTHER

## 2023-05-30 RX ORDER — ATORVASTATIN CALCIUM 80 MG/1
80 TABLET, FILM COATED ORAL DAILY
Qty: 90 TABLET | Refills: 3 | Status: SHIPPED | OUTPATIENT
Start: 2023-05-30 | End: 2024-03-04 | Stop reason: SDUPTHER

## 2023-05-30 RX ORDER — PAROXETINE HYDROCHLORIDE 20 MG/1
20 TABLET, FILM COATED ORAL DAILY
Qty: 90 TABLET | Refills: 2 | Status: SHIPPED | OUTPATIENT
Start: 2023-05-30 | End: 2024-03-04 | Stop reason: SDUPTHER

## 2023-05-30 NOTE — PROGRESS NOTES
[facility] INTERNAL MEDICINE  OUTPATIENT OFFICE VISIT NOTE    SUBJECTIVE:      HPI: Luz Elena Saunders is a 72 y.o. female w/ PMH of CAD s/p CABG x3 (LIMA- LAD, SVG to OM, SVG to RCA in May 2018), HTN, HLD, hypothyroid, migraine, neurofibromatosis type II, vit D deficiency, and osteoporosis. Presents today for f/u.     Follows cardiology   Ordered mammogram and Cologuard  Reports headaches every day, discontinuing Imdur  Reducing Synthroid dose to 75 daily  Labs prior to next visit  Refilled medications     Lives with boyfriend  Denies tobacco alcohol or drug use     Echocardiogram from 1/27/2021:  Normal LV size and function  LVEF 55%  Mild MR  Mild AV sclerosis  Mild TR, RVSP 35  Moderate dilated left atrium  Known PFO with left-to-right shunt  IVC dilated     Holter monitor: 2/10/2021:   Sinus rhythm with average heart rate 73 bpm, no episodes of tachycardia, no episodes of bradycardia  Short burst of SVT was 5 beats at 122 bpm  Longest V beat was 3 beats at 97 bpm   No diary returned    Nuclear stress: 10/2022:  Conclusion         Abnormal myocardial perfusion scan.    There are two significant perfusion abnormalities.    Perfusion Abnormality #1 - There is a mild intensity, moderate sized, mostly reversible perfusion abnormality with some fixed areas in the basal to distal anterolateral wall(s) in the typical distribution of the LCX territory.    Perfusion Abnormality #2 - There is a small sized, moderate intensity, reversible perfusion abnormality that is consistent with ischemia in the distal apex wall(s) in the typical distribution of the LAD territory.    There are no other significant perfusion abnormalities.    The gated perfusion images showed an ejection fraction of 77% at rest. The gated perfusion images showed an ejection fraction of 72% post stress.    The EKG portion of this study is negative for ischemia.    The patient reported no chest pain during the stress test.    There were no arrhythmias during  stress.    ROS:  (+)  (-) Chest pain, palpitations, SOB, dizziness, syncope, edema, orthopnea, cough, fever, chills, abdominal pain, vomiting, diarrhea, dysuria, bleeding    OBJECTIVE:     Vital signs:   /71 (BP Location: Left arm, Patient Position: Sitting, BP Method: Medium (Automatic))   Pulse 65   Temp 98.4 °F (36.9 °C) (Oral)   Resp 18   Wt 43 kg (94 lb 12.8 oz)   SpO2 99%   BMI 19.37 kg/m²      Physical Examination:  General:  Well-nourished, well-developed, no acute distress  HEENT: Normocephalic, atraumatic. EOMI.  MMM  Neck: Supple. No obvious JVD.  Normal range of motion.  Respiratory: CTAB.  No obvious crackles wheeze or rhonchi.  Cardiovascular:  RRR.  No obvious M/G/R. Warm extremities.  Peripheral pulses intact.  No edema.  Gastrointestinal:  Soft, nontender, nondistended.  Normal bowel sounds.  Neurologic: ANOx3.  Answering questions/following commands appropriately.  No FND    Current Outpatient Medications:     cholecalciferol, vitamin D3, 125 mcg (5,000 unit) Tab, Take 5,000 Units by mouth once daily., Disp: , Rfl:     naproxen (NAPROSYN) 500 MG tablet, Take 1 tablet (500 mg total) by mouth 2 (two) times daily., Disp: 30 tablet, Rfl: 3    nitroGLYCERIN (NITROSTAT) 0.4 MG SL tablet, Place 1 tablet (0.4 mg total) under the tongue every 5 (five) minutes as needed for Chest pain., Disp: 30 tablet, Rfl: 3    alendronate (FOSAMAX) 70 MG tablet, Take 1 tablet (70 mg total) by mouth every 7 days. 1 tablet per week, Disp: 12 tablet, Rfl: 3    aspirin (ECOTRIN) 81 MG EC tablet, Take 1 tablet (81 mg total) by mouth once daily., Disp: 90 tablet, Rfl: 3    atorvastatin (LIPITOR) 80 MG tablet, Take 1 tablet (80 mg total) by mouth once daily., Disp: 90 tablet, Rfl: 3    levothyroxine (SYNTHROID) 75 MCG tablet, Take 1 tablet (75 mcg total) by mouth before breakfast., Disp: 90 tablet, Rfl: 3    lisinopriL (PRINIVIL,ZESTRIL) 20 MG tablet, Take 1 tablet (20 mg total) by mouth once daily., Disp: 90 tablet,  Rfl: 2    metoprolol succinate (TOPROL-XL) 100 MG 24 hr tablet, Take 1 tablet (100 mg total) by mouth once daily., Disp: 90 tablet, Rfl: 3    paroxetine (PAXIL) 20 MG tablet, Take 1 tablet (20 mg total) by mouth once daily., Disp: 90 tablet, Rfl: 2    rizatriptan (MAXALT) 10 MG tablet, TAKE ONE tablet by MOUTH as needed FOR MIGRAINE. MAY REPEAT in TWO hours IF needed. DO not exceed THREE TABLETS in 24 hours, Disp: 15 tablet, Rfl: 6     ASSESSMENT & PLAN:     CAD s/p CABG x3-2018  HTN  HLD  -Follows with cardiology, continue  -no longer on xarelto per cardiology  -Continue aspirin 81, Lipitor 80, Toprol 100, PRN SL nitro, lisinopril 20  -discontinued Imdur secondary to daily headaches   -will consider addition of calcium channel blocker as needed for chest pain  -No acute symptoms or complaints today     Afib CVR  -s/p modified cryo-MAZE and TOBI ligation in 2018 at time of CABG  -off Xarelto     Osteoporosis   Vit D def  -repleted with 50K weekly in past   -daily calcium and vit d multivitamin  -refilled Alendronate 70 mg weekly  -advised dentist eval      Hypothyroidism  -Reduced Synthroid to 75  -repeat labs ordered prior to next visit     Migraines  -Continue tylenol/naproxen and rizatriptan as needed  -not used triptan in nearly half year  -preferred to avoid if not controlled with stopping Imdur secondary to CAD  -will consider addition of Topamax as needed     Neurofibromatosis type II  -Previously evaluated by specialist in [place], per patient  -No further treatment     DEXA scan-4/2022 with osteoporosis   Breast cancer screening- ordered mammogram  Gynecology screening-denies referral; had hysterectomy 1985  Colon cancer screening- ordered Cologuard   Vaccines-  COVID-19: Vaccinated x2  Influenza:  January 2022  PNA 13 + 23: in 2019/2020  Tetanus: 01/2023  Zoster: Declines, reassess each visit    Guzman Welch MD

## 2023-08-14 NOTE — PROGRESS NOTES
CHIEF COMPLAINT:   Chief Complaint   Patient presents with    Follow-up     4 mos f/u denies cardiac targets                                                  HPI:  Luz Elena Saunders 72 y.o. female with CAD (s/p CABG x 3 May 2018- LIMA-LAD, SVG- OM and SVG -RCA), Atrial Fibrillation (s/p modified cryo MAZE ablation and TOBI ligation at time of CABG), HTN, hypothyroidism, migraines, neurofibromatosis, osteoporosis presents for follow up and ongoing care.  Patient completed a nuclear stress test on 10.12.22 which revealed a mild intensity, moderate sized mostly reversible defect in Lcx territory and small moderate reversible apical defect.  Patient's provider at that time felt the chest pain was non-anginal and did not feel patient needed to undergo coronary angiogram at that time.  At last appointment patient denied any frequent episodes of chest pain, however reported occasional shortness of breath with exertion but stated that it was not interfering in her ADLs. Of note, patient completed a 30 day event monitor November 2022, which did not reveal any AFIb episodes, thus Xarelto was discontinued.     Today the patient reports that she is feeling well overall.  She denies any chest pain, shortness of breath, PND, orthopnea, lightheadedness, dizziness, syncope, or claudication symptoms.  She does state that she does have occasional dyspnea on exertion but states that it has not worsened since her last office visit and she is able to complete her ADLs.  She states that she was having headaches a few weeks ago for which her PCP took her off of Imdur, however was actually not stopped.  Upon further questioning, patient states that the headaches are not very severe and she would like to stay on the Imdur for now.  She reports that she is able to complete her ADLs without any issues or ischemic symptoms.  She states that she is not very active but that her main limiting factor is just generalized fatigue.  She reports  compliance with all medications.  She denies any tobacco use.                                                                                                                                                                                                                                                                                                                                                                                                                                                                                    Nuclear Stress Test 10.12.22  Abnormal myocardial perfusion scan.  There are two significant perfusion abnormalities.  Perfusion Abnormality #1 - There is a mild intensity, moderate sized, mostly reversible perfusion abnormality with some fixed areas in the basal to distal anterolateral wall(s) in the typical distribution of the LCX territory.  Perfusion Abnormality #2 - There is a small sized, moderate intensity, reversible perfusion abnormality that is consistent with ischemia in the distal apex wall(s) in the typical distribution of the LAD territory.  There are no other significant perfusion abnormalities.  The gated perfusion images showed an ejection fraction of 77% at rest. The gated perfusion images showed an ejection fraction of 72% post stress.  The EKG portion of this study is negative for ischemia.  The patient reported no chest pain during the stress test.  There were no arrhythmias during stress.      Patient Active Problem List   Diagnosis    Coronary artery disease of native artery of native heart with stable angina pectoris    Hx of CABG    Primary hypertension    Other specified hypothyroidism    Neurofibromatosis    Migraine headache    PAF (paroxysmal atrial fibrillation)    Medication refill    Osteoporosis    Vitamin D deficiency    HLD (hyperlipidemia)     Past Surgical History:   Procedure Laterality Date    CORONARY ARTERY BYPASS GRAFT      HYSTERECTOMY       Social  "History     Socioeconomic History    Marital status:    Tobacco Use    Smoking status: Never    Smokeless tobacco: Never   Substance and Sexual Activity    Alcohol use: Not Currently     Alcohol/week: 1.0 standard drink of alcohol     Types: 1 Cans of beer per week     Comment: monthly    Drug use: Never    Sexual activity: Not Currently     Partners: Male        Family History   Problem Relation Age of Onset    Stroke Mother     Heart disease Father      Review of patient's allergies indicates:   Allergen Reactions    Contrast media     Codeine Nausea And Vomiting    Dye Dermatitis     IV contrast    Tramadol Nausea And Vomiting         ROS:  Review of Systems   Constitutional: Negative.    HENT: Negative.     Eyes: Negative.    Respiratory:  Negative for shortness of breath (Occasional).    Cardiovascular:  Negative for chest pain, palpitations, orthopnea, claudication, leg swelling and PND.   Gastrointestinal: Negative.    Genitourinary: Negative.    Musculoskeletal: Negative.    Skin: Negative.    Neurological:  Positive for headaches (Mild).   Endo/Heme/Allergies: Negative.    Psychiatric/Behavioral: Negative.                                                                                                                                                                                    PHYSICAL EXAM:  Visit Vitals  /69 (BP Location: Right arm, Patient Position: Sitting, BP Method: Medium (Automatic))   Pulse 60   Temp 97.8 °F (36.6 °C)   Resp 18   Ht 4' 11.06" (1.5 m)   Wt 44.8 kg (98 lb 12.3 oz)   SpO2 100%   BMI 19.91 kg/m²         Physical Exam  Constitutional:       Appearance: Normal appearance.   HENT:      Head: Normocephalic and atraumatic.      Mouth/Throat:      Mouth: Mucous membranes are moist.   Eyes:      Extraocular Movements: Extraocular movements intact.      Pupils: Pupils are equal, round, and reactive to light.   Neck:      Vascular: No carotid bruit.   Cardiovascular:      Rate " and Rhythm: Normal rate and regular rhythm.      Pulses: Normal pulses.      Heart sounds: Normal heart sounds. No murmur heard.  Pulmonary:      Effort: Pulmonary effort is normal.      Breath sounds: Normal breath sounds.   Abdominal:      Palpations: Abdomen is soft.   Musculoskeletal:         General: Normal range of motion.      Cervical back: Normal range of motion.      Right lower leg: No edema.      Left lower leg: No edema.   Skin:     General: Skin is warm and dry.   Neurological:      General: No focal deficit present.      Mental Status: She is alert and oriented to person, place, and time.   Psychiatric:         Mood and Affect: Mood normal.         Behavior: Behavior normal.         Current Outpatient Medications   Medication Instructions    alendronate (FOSAMAX) 70 mg, Oral, Every 7 days, 1 tablet per week    aspirin (ECOTRIN) 81 mg, Oral, Daily    atorvastatin (LIPITOR) 80 mg, Oral, Daily    cholecalciferol (vitamin D3) 5,000 Units, Oral, Daily    levothyroxine (SYNTHROID) 75 mcg, Oral, Before breakfast    lisinopriL (PRINIVIL,ZESTRIL) 20 mg, Oral, Daily    metoprolol succinate (TOPROL-XL) 100 mg, Oral, Daily    naproxen (NAPROSYN) 500 mg, Oral, 2 times daily    nitroGLYCERIN (NITROSTAT) 0.4 mg, Sublingual, Every 5 min PRN    paroxetine (PAXIL) 20 mg, Oral, Daily    rizatriptan (MAXALT) 10 MG tablet TAKE ONE tablet by MOUTH as needed FOR MIGRAINE. MAY REPEAT in TWO hours IF needed. DO not exceed THREE TABLETS in 24 hours        All medications, laboratory studies, cardiac diagnostic imaging reviewed.     Lab Results   Component Value Date    LDL 73.00 01/11/2023    LDL 62 05/31/2018    TRIG 57 01/11/2023    TRIG 144 05/31/2018    CREATININE 0.74 01/11/2023    MG 2.0 06/02/2018    K 4.1 01/11/2023        ASSESSMENT/PLAN:  CAD (s/p CABGx3 05/18)  LIMA-LAD, SVG-OM and SVG-RCA  - Nuclear stress test showed a mild intensity, medium sized mostly reversible Lcx defect and a small, moderate intensity  reversible apical LAD defect  - Per previous note, patient's pain appears non-anginal and defects are not in large territories, will continue to monitor.  - Patient denies any recent episodes of chest pain and denies any SL nitro use  - Continue Aspirin, Lipitor, Toprol, Imdur, and SL Nitro prn chest pain   - Was reportedly taken off Imdur for headaches per PCP, however patient actually never stop the medication.  At this time she states that the headaches are mild in intensity and she would like to continue with Imdur.  Instructed patient to notify clinic if headaches become worse, as there are alternative therapies we can try at that time  - We will continue to hold off on coronary angiogram at this time as patient has been asymptomatic   - ED precautions given    Atrial Fibrillation   Patient is s/p modified cryo-MAZE and TOBI ligation in 2018 at time of CABG  - Denies palpitations or tachycardia  - During a previous visit, patient complained of easy bruising and asked if she could stop Xarelto.    - Patient had not had evidence of AFib since his surgery in 2018.  - Long discussion was held with pt and she understood there may be a small risk of undiagnosed Afib that may potentially cause a CVA. Xarelto was stopped.  - 30 day event monitor Nov. 2022 - no evidence of AFIB  - Continue Toprol  mg daily  - Will remain off of Xarelto for now  - Auscultation today revealed normal sounding rhythm and rate     HTN  - BP at goal today 111/69  - Continue Lisinopril, Toprol XL, and Imdur   - Counseled on low salt diet and exercise as tolerated     HLD  - LDL near goal - 73 per labs January 2023  - Continue high intensity statin  - Counseled on low-cholesterol, heart healthy diet and exercise as tolerated    Abdominal Pain/Indigestion  - Will have patient trial Protonix   - Counseled on diet      Hypothyroidism  - Management per PCP     Start taking Protonix daily   Continue taking Imdur, if headaches persist please  notify clinic   Follow up in cardiology clinic in 4 months or sooner if needed   Follow up with PCP as directed

## 2023-08-22 ENCOUNTER — OFFICE VISIT (OUTPATIENT)
Dept: CARDIOLOGY | Facility: CLINIC | Age: 73
End: 2023-08-22
Payer: COMMERCIAL

## 2023-08-22 VITALS
TEMPERATURE: 98 F | WEIGHT: 98.75 LBS | DIASTOLIC BLOOD PRESSURE: 69 MMHG | HEIGHT: 59 IN | RESPIRATION RATE: 18 BRPM | BODY MASS INDEX: 19.91 KG/M2 | HEART RATE: 60 BPM | SYSTOLIC BLOOD PRESSURE: 111 MMHG | OXYGEN SATURATION: 100 %

## 2023-08-22 DIAGNOSIS — I10 PRIMARY HYPERTENSION: ICD-10-CM

## 2023-08-22 DIAGNOSIS — Z95.1 HX OF CABG: ICD-10-CM

## 2023-08-22 DIAGNOSIS — E78.2 MIXED HYPERLIPIDEMIA: ICD-10-CM

## 2023-08-22 DIAGNOSIS — I48.0 PAF (PAROXYSMAL ATRIAL FIBRILLATION): ICD-10-CM

## 2023-08-22 DIAGNOSIS — I25.118 CORONARY ARTERY DISEASE OF NATIVE ARTERY OF NATIVE HEART WITH STABLE ANGINA PECTORIS: Primary | ICD-10-CM

## 2023-08-22 PROCEDURE — 99215 OFFICE O/P EST HI 40 MIN: CPT | Mod: PBBFAC

## 2023-08-22 RX ORDER — PANTOPRAZOLE SODIUM 40 MG/1
40 TABLET, DELAYED RELEASE ORAL DAILY
Qty: 30 TABLET | Refills: 11 | Status: SHIPPED | OUTPATIENT
Start: 2023-08-22 | End: 2024-08-21

## 2023-08-22 NOTE — PATIENT INSTRUCTIONS
Start taking Protonix daily   Continue taking Imdur, if headaches persist please notify clinic   Follow up in cardiology clinic in 4 months or sooner if needed   Follow up with PCP as directed

## 2023-09-14 RX ORDER — ISOSORBIDE MONONITRATE 30 MG/1
30 TABLET, EXTENDED RELEASE ORAL DAILY
COMMUNITY
Start: 2023-08-01 | End: 2023-12-22 | Stop reason: SDUPTHER

## 2023-09-19 ENCOUNTER — TELEPHONE (OUTPATIENT)
Dept: INTERNAL MEDICINE | Facility: CLINIC | Age: 73
End: 2023-09-19

## 2023-09-19 ENCOUNTER — OFFICE VISIT (OUTPATIENT)
Dept: INTERNAL MEDICINE | Facility: CLINIC | Age: 73
End: 2023-09-19
Payer: COMMERCIAL

## 2023-09-19 VITALS
HEART RATE: 63 BPM | DIASTOLIC BLOOD PRESSURE: 71 MMHG | SYSTOLIC BLOOD PRESSURE: 111 MMHG | WEIGHT: 102 LBS | RESPIRATION RATE: 18 BRPM | TEMPERATURE: 98 F | BODY MASS INDEX: 20.56 KG/M2 | OXYGEN SATURATION: 98 %

## 2023-09-19 DIAGNOSIS — I25.118 CORONARY ARTERY DISEASE OF NATIVE ARTERY OF NATIVE HEART WITH STABLE ANGINA PECTORIS: ICD-10-CM

## 2023-09-19 DIAGNOSIS — Z95.1 HX OF CABG: ICD-10-CM

## 2023-09-19 DIAGNOSIS — Z00.00 HEALTHCARE MAINTENANCE: ICD-10-CM

## 2023-09-19 DIAGNOSIS — Z23 NEED FOR VACCINATION: Primary | ICD-10-CM

## 2023-09-19 DIAGNOSIS — E87.5 HYPERKALEMIA: Primary | ICD-10-CM

## 2023-09-19 DIAGNOSIS — Z28.21 INFLUENZA VACCINATION DECLINED: ICD-10-CM

## 2023-09-19 DIAGNOSIS — I48.0 PAF (PAROXYSMAL ATRIAL FIBRILLATION): ICD-10-CM

## 2023-09-19 DIAGNOSIS — N17.9 AKI (ACUTE KIDNEY INJURY): ICD-10-CM

## 2023-09-19 PROCEDURE — 99214 OFFICE O/P EST MOD 30 MIN: CPT | Mod: 25,PBBFAC

## 2023-09-19 PROCEDURE — 90750 HZV VACC RECOMBINANT IM: CPT | Mod: PBBFAC

## 2023-09-19 PROCEDURE — 90471 IMMUNIZATION ADMIN: CPT | Mod: PBBFAC

## 2023-09-19 NOTE — TELEPHONE ENCOUNTER
Attempted to contact patient several times regarding significant lab results today of hyperkalemia 5.5 as well as MARYAM with current Cr 1.37 vs patient baseline at 0.74. On first attempt, younger female picked up the phone and did not speak. Second and third attempts went to voicemail which confirmed this was patient's contact information. Will continue to attempt contact as she needs to be shifted with Lokelma x 3 days and repeat BMP this Friday. Unsure if listed pharmacy is accurate. Patient is to discontinue taking Lisinopril until further notice.      4th attempt: 9/20/23 6:22AM   Successfully contacted Ms. Saunders. Discussed concerning lab work and updated plan of care. ED precautions given. Patient verbalized understanding.       Patient case and plan of care discussed with Dr. Carpio.        Continue to monitor closely.       Jose David Cartagena MD   [facility] Internal Medicine HO-1

## 2023-09-19 NOTE — PROGRESS NOTES
IM Clinic Note    Patient Name: Luz Elena Saunders  YOB: 1950   MRN: [id number]  Date: 09/19/2023  Home Address: [address] [place], [place] [id number]      Subjective:     Chief Complaint:   Chief Complaint   Patient presents with    Follow-up     Lab Results. Medication Management. Headaches remains- everyday- mild ache       HPI:  Luz Elena Saunders is a 72 y.o. year old female with hx of CAD (s/p CABG x 3 May 2018- LIMA-LAD, SVG- OM and SVG -RCA), Atrial Fibrillation (s/p modified cryo MAZE ablation and TOBI ligation at time of CABG), HTN, hypothyroidism, migraines, neurofibromatosis, osteoporosis who presents to clinic today for routine follow-up. Since last visit she reports no acute issues. Denies chest pain and shortness of breath. Not interested in Flu vaccination today or previously ordered mammogram, states she has Cologuard at home but not completed yet.     Available notes and lab results since last visit were reviewed; Patient was last seen in clinic on 5/30/23.     Care Team   [facility] - last seen 8/22/23, scheduled to follow-up on 12/22/23. Protonix qd and Imdur but dced by PCP due to HA and then restarted. Remain off of Xarelto. 30 day event monitor unremarkable. NST on 10/12/22 significant for mild intensity, moderate sized mostly reversible defect in LCX territory and small moderate reversible apical defect. Declined LHC, held off due to asymptomatic at the time of last f/u.     Past Medical History:   Diagnosis Date    Carotid artery occlusion     Hypertension     Migraine     Neurofibromatosis         Past Surgical History:   Procedure Laterality Date    CORONARY ARTERY BYPASS GRAFT      HYSTERECTOMY         Allergy:  Review of patient's allergies indicates:   Allergen Reactions    Contrast media     Codeine Nausea And Vomiting    Dye Dermatitis     IV contrast    Tramadol Nausea And Vomiting        Current Medications:    Current Outpatient Medications:     alendronate (FOSAMAX) 70 MG  tablet, Take 1 tablet (70 mg total) by mouth every 7 days. 1 tablet per week, Disp: 12 tablet, Rfl: 3    aspirin (ECOTRIN) 81 MG EC tablet, Take 1 tablet (81 mg total) by mouth once daily., Disp: 90 tablet, Rfl: 3    atorvastatin (LIPITOR) 80 MG tablet, Take 1 tablet (80 mg total) by mouth once daily., Disp: 90 tablet, Rfl: 3    cholecalciferol, vitamin D3, 125 mcg (5,000 unit) Tab, Take 5,000 Units by mouth once daily., Disp: , Rfl:     isosorbide mononitrate (IMDUR) 30 MG 24 hr tablet, Take 30 mg by mouth once daily., Disp: , Rfl:     levothyroxine (SYNTHROID) 75 MCG tablet, Take 1 tablet (75 mcg total) by mouth before breakfast., Disp: 90 tablet, Rfl: 3    lisinopriL (PRINIVIL,ZESTRIL) 20 MG tablet, Take 1 tablet (20 mg total) by mouth once daily., Disp: 90 tablet, Rfl: 2    metoprolol succinate (TOPROL-XL) 100 MG 24 hr tablet, Take 1 tablet (100 mg total) by mouth once daily., Disp: 90 tablet, Rfl: 3    nitroGLYCERIN (NITROSTAT) 0.4 MG SL tablet, Place 1 tablet (0.4 mg total) under the tongue every 5 (five) minutes as needed for Chest pain., Disp: 30 tablet, Rfl: 3    pantoprazole (PROTONIX) 40 MG tablet, Take 1 tablet (40 mg total) by mouth once daily., Disp: 30 tablet, Rfl: 11    paroxetine (PAXIL) 20 MG tablet, Take 1 tablet (20 mg total) by mouth once daily., Disp: 90 tablet, Rfl: 2    rizatriptan (MAXALT) 10 MG tablet, TAKE ONE tablet by MOUTH as needed FOR MIGRAINE. MAY REPEAT in TWO hours IF needed. DO not exceed THREE TABLETS in 24 hours, Disp: 15 tablet, Rfl: 6     Social History:   reports that she has never smoked. She has never used smokeless tobacco. She reports that she does not currently use alcohol after a past usage of about 1.0 standard drink of alcohol per week. She reports that she does not use drugs.   Patient confirms that she has never smoked cigarettes.   Lives with boyfriend for the last 6 years. Supportive home.     Family History   Problem Relation Age of Onset    Stroke Mother      Heart disease Father    Older sister  due to MI     Immunization History   Administered Date(s) Administered    COVID-19, MRNA, LN-S, PF (Pfizer) (Purple Cap) 2021, 2021    Influenza - High Dose - PF (65 years and older) 12/10/2020, 2022    Influenza - Trivalent (ADULT) 2017, 2019    Pneumococcal Conjugate - 13 Valent 2019    Pneumococcal Polysaccharide - 23 Valent 12/10/2020    Tdap 2023    Zoster Recombinant 2023       Review of Systems   Constitutional:  Negative for chills, fever and weight loss.   Eyes:  Negative for blurred vision and double vision.   Respiratory:  Negative for cough and shortness of breath.    Cardiovascular:  Negative for chest pain, palpitations and leg swelling.   Gastrointestinal:  Negative for abdominal pain, blood in stool, heartburn, melena, nausea and vomiting.   Musculoskeletal:  Negative for falls.   Neurological:  Negative for dizziness, weakness and headaches.   Endo/Heme/Allergies:  Negative for polydipsia.   Psychiatric/Behavioral:  Negative for depression and substance abuse. The patient is not nervous/anxious and does not have insomnia.        Objective:      Physical Exam  Vitals:    23 1257   BP: 111/71   BP Location: Right arm   Patient Position: Sitting   BP Method: Small (Automatic)   Pulse: 63   Resp: 18   Temp: 98.1 °F (36.7 °C)   TempSrc: Oral   SpO2: 98%   Weight: 46.3 kg (102 lb)        Wt Readings from Last 3 Encounters:   23 46.3 kg (102 lb)   23 44.8 kg (98 lb 12.3 oz)   23 43 kg (94 lb 12.8 oz)       Physical Exam  Vitals and nursing note reviewed.   Constitutional:       General: She is not in acute distress.     Appearance: She is not ill-appearing.   Neck:      Vascular: No carotid bruit.      Comments: No JVD  Cardiovascular:      Rate and Rhythm: Regular rhythm. Bradycardia present.      Heart sounds: Murmur heard.   Pulmonary:      Effort: Pulmonary effort is normal. No  respiratory distress.      Breath sounds: No wheezing.      Comments: No pulmonary crackles  Musculoskeletal:      Cervical back: Normal range of motion.      Right lower leg: No edema.      Left lower leg: No edema.   Skin:     General: Skin is warm and dry.      Capillary Refill: Capillary refill takes less than 2 seconds.      Coloration: Skin is pale.      Findings: Lesion present. No bruising.      Comments: +Neurofibromas throughout skin, chronic appearing    Neurological:      Mental Status: She is alert and oriented to person, place, and time. Mental status is at baseline.      Motor: No weakness.          Body mass index is 20.56 kg/m².      Lab Visit on 09/19/2023   Component Date Value Ref Range Status    Sodium Level 09/19/2023 138  136 - 145 mmol/L Final    Potassium Level 09/19/2023 5.5 (H)  3.5 - 5.1 mmol/L Final    Chloride 09/19/2023 107  98 - 107 mmol/L Final    Carbon Dioxide 09/19/2023 24  23 - 31 mmol/L Final    Glucose Level 09/19/2023 86  82 - 115 mg/dL Final    Blood Urea Nitrogen 09/19/2023 31.8 (H)  9.8 - 20.1 mg/dL Final    Creatinine 09/19/2023 1.37 (H)  0.55 - 1.02 mg/dL Final    Calcium Level Total 09/19/2023 9.4  8.4 - 10.2 mg/dL Final    Protein Total 09/19/2023 7.1  5.8 - 7.6 gm/dL Final    Albumin Level 09/19/2023 3.7  3.4 - 4.8 g/dL Final    Globulin 09/19/2023 3.4  2.4 - 3.5 gm/dL Final    Albumin/Globulin Ratio 09/19/2023 1.1  1.1 - 2.0 ratio Final    Bilirubin Total 09/19/2023 0.4  <=1.5 mg/dL Final    Alkaline Phosphatase 09/19/2023 38 (L)  40 - 150 unit/L Final    Alanine Aminotransferase 09/19/2023 9  0 - 55 unit/L Final    Aspartate Aminotransferase 09/19/2023 16  5 - 34 unit/L Final    eGFR 09/19/2023 41  mls/min/1.73/m2 Final    Hemoglobin A1c 09/19/2023 5.4  <=7.0 % Final    Estimated Average Glucose 09/19/2023 108.3  mg/dL Final    Thyroid Stimulating Hormone 09/19/2023 1.037  0.350 - 4.940 uIU/mL Final    Thyroxine Free 09/19/2023 1.18  0.70 - 1.48 ng/dL Final    Vit D  25 OH 09/19/2023 26.1 (L)  30.0 - 80.0 ng/mL Final    Cholesterol Total 09/19/2023 139  <=200 mg/dL Final    HDL Cholesterol 09/19/2023 50  35 - 60 mg/dL Final    Triglyceride 09/19/2023 44  37 - 140 mg/dL Final    Cholesterol/HDL Ratio 09/19/2023 3  0 - 5 Final    Very Low Density Lipoprotein 09/19/2023 9   Final    LDL Cholesterol 09/19/2023 80.00  50.00 - 140.00 mg/dL Final    WBC 09/19/2023 7.28  4.50 - 11.50 x10(3)/mcL Final    RBC 09/19/2023 4.28  4.20 - 5.40 x10(6)/mcL Final    Hgb 09/19/2023 12.4  12.0 - 16.0 g/dL Final    Hct 09/19/2023 38.7  37.0 - 47.0 % Final    MCV 09/19/2023 90.4  80.0 - 94.0 fL Final    MCH 09/19/2023 29.0  27.0 - 31.0 pg Final    MCHC 09/19/2023 32.0 (L)  33.0 - 36.0 g/dL Final    RDW 09/19/2023 15.4  11.5 - 17.0 % Final    Platelet 09/19/2023 236  130 - 400 x10(3)/mcL Final    MPV 09/19/2023 11.7 (H)  7.4 - 10.4 fL Final    Neut % 09/19/2023 61.6  % Final    Lymph % 09/19/2023 24.5  % Final    Mono % 09/19/2023 9.8  % Final    Eos % 09/19/2023 2.6  % Final    Basophil % 09/19/2023 1.2  % Final    Lymph # 09/19/2023 1.78  0.6 - 4.6 x10(3)/mcL Final    Neut # 09/19/2023 4.49  2.1 - 9.2 x10(3)/mcL Final    Mono # 09/19/2023 0.71  0.1 - 1.3 x10(3)/mcL Final    Eos # 09/19/2023 0.19  0 - 0.9 x10(3)/mcL Final    Baso # 09/19/2023 0.09  <=0.2 x10(3)/mcL Final    IG# 09/19/2023 0.02  0 - 0.04 x10(3)/mcL Final    IG% 09/19/2023 0.3  % Final    NRBC% 09/19/2023 0.0  % Final       Results for orders placed during the hospital encounter of 10/12/22     Nuclear Stress - Cardiology Interpreted     Interpretation Summary    Abnormal myocardial perfusion scan.    There are two significant perfusion abnormalities.    Perfusion Abnormality #1 - There is a mild intensity, moderate sized, mostly reversible perfusion abnormality with some fixed areas in the basal to distal anterolateral wall(s) in the typical distribution of the LCX territory.    Perfusion Abnormality #2 - There is a small sized,  moderate intensity, reversible perfusion abnormality that is consistent with ischemia in the distal apex wall(s) in the typical distribution of the LAD territory.    There are no other significant perfusion abnormalities.    The gated perfusion images showed an ejection fraction of 77% at rest. The gated perfusion images showed an ejection fraction of 72% post stress.    The EKG portion of this study is negative for ischemia.    The patient reported no chest pain during the stress test.    There were no arrhythmias during stress.        Echocardiogram from 1/27/2021:  Normal LV size and function  LVEF 55%  Mild MR  Mild AV sclerosis  Mild TR, RVSP 35  Moderate dilated left atrium  Known PFO with left-to-right shunt  IVC dilated     48-hour holter monitor: 2/10/2021:   Sinus rhythm with average heart rate 73 bpm, no episodes of tachycardia, no episodes of bradycardia  Short burst of SVT was 5 beats at 122 bpm  Longest V beat was 3 beats at 97 bpm   No diary returned    Assessment:       Plan  Need for vaccination  Healthcare maintenance  Influenza Vaccine Declined   - Zoster Recombinant Vaccine Dose 1/2 for in clinic administration today   -Discussed importance and risks vs benefits of Flu Vaccination x 3, patient verbalized understanding and declines at this time. Will revisit at next clinic appointment  -Patient also not interested in completing previously ordered mammogram, described risks vs benefits, patient verbalized understanding.   -Reminded patient to complete cologuard, provided instruction brochure to take home during this visit as well     Hx of PAF (paroxysmal atrial fibrillation) s/p modified cryo MAZE ablation   Coronary artery disease of native artery of native heart with stable angina pectoris s/p CABG x 3   HTN  -Discussed concerning abnormal nuclear stress test given significant PMHx as well as Fhx, highly recommended previously recommended LHC per [facility] despite being largely  asymptomatic. Patient verbalized understanding, has agreed to discuss indication at next cardiology appointment. Continue to monitor.   -Continue ASA 81qd, Toprol 100mg qd, Lisinopril 20mg qd, Imdur 30mg qd, and Lipitor 80mg qd   -Confirmed patient has PRN SL Nitroglycerin at home in the event of chest pain   -Continue Protonix 40mg qd and Paxil 20mg qd   -Counseled patient on avoidance of NSAIDs   -Recommended healthy lifestyle modifications including low Na, low cholesterol diet and physical activity 30min per day x 5 days per week as tolerated     Hypothyroidism   -Continue Synthroid 75mcg qAM   -Recheck TSH and free T4 at next clinic appointment     Osteoporosis   -Last DEXA scan 4/15/22 significant for severe osteoporosis of lumbar spine and femurs   -Continue Fosamax 70mg qweekly, start date:9/19/22, end date:9/19/27    ED precautions given.         Patient case and plan of care discussed with Dr. Carpio.     Disposition: RTC in 6 months or can call in for sooner appointment if needed     Jose David Cartagena MD   Internal Medicine - -1

## 2023-09-20 ENCOUNTER — TELEPHONE (OUTPATIENT)
Dept: INTERNAL MEDICINE | Facility: CLINIC | Age: 73
End: 2023-09-20
Payer: COMMERCIAL

## 2023-09-20 NOTE — TELEPHONE ENCOUNTER
----- Message from Jose David Cartagena MD sent at 9/19/2023  5:34 PM CDT -----  Enrique Clay, rohan ARIAS. I received her final results just now she needs to be shifted and brought back for BMP per Dr. Carpio. I need to know which pharmacy she is closest to so she can start this right away. Let me know if you have better luck contacting her. Thanks.

## 2023-09-20 NOTE — TELEPHONE ENCOUNTER
Pt called, name and  verified . Pt informed to d/c lisinopril until further notice. Pt also informed that new Rx of sodium zirconium cyclosilicate  was sent to pharmacy - [facility]. Pt needs to take this daily and on Friday pt needs to report for lab drawn of BMP. Pt verbalized 100% understanding. No further questions or concerns noted. Call ended.

## 2023-09-25 ENCOUNTER — LAB VISIT (OUTPATIENT)
Dept: LAB | Facility: HOSPITAL | Age: 73
End: 2023-09-25
Payer: COMMERCIAL

## 2023-09-25 ENCOUNTER — TELEPHONE (OUTPATIENT)
Dept: INTERNAL MEDICINE | Facility: CLINIC | Age: 73
End: 2023-09-25
Payer: COMMERCIAL

## 2023-09-25 DIAGNOSIS — E87.5 HYPERKALEMIA: ICD-10-CM

## 2023-09-25 DIAGNOSIS — N17.9 AKI (ACUTE KIDNEY INJURY): ICD-10-CM

## 2023-09-25 DIAGNOSIS — E87.5 HYPERKALEMIA: Primary | ICD-10-CM

## 2023-09-25 DIAGNOSIS — R89.9 ABNORMAL LABORATORY TEST RESULT: ICD-10-CM

## 2023-09-25 LAB
ANION GAP SERPL CALC-SCNC: 9 MEQ/L
BUN SERPL-MCNC: 24.1 MG/DL (ref 9.8–20.1)
CALCIUM SERPL-MCNC: 8.6 MG/DL (ref 8.4–10.2)
CHLORIDE SERPL-SCNC: 110 MMOL/L (ref 98–107)
CO2 SERPL-SCNC: 23 MMOL/L (ref 23–31)
CREAT SERPL-MCNC: 1.21 MG/DL (ref 0.55–1.02)
CREAT/UREA NIT SERPL: 20
GFR SERPLBLD CREATININE-BSD FMLA CKD-EPI: 47 MLS/MIN/1.73/M2
GLUCOSE SERPL-MCNC: 80 MG/DL (ref 82–115)
POTASSIUM SERPL-SCNC: 4.9 MMOL/L (ref 3.5–5.1)
SODIUM SERPL-SCNC: 142 MMOL/L (ref 136–145)

## 2023-09-25 PROCEDURE — 36415 COLL VENOUS BLD VENIPUNCTURE: CPT

## 2023-09-25 PROCEDURE — 80048 BASIC METABOLIC PNL TOTAL CA: CPT

## 2023-09-25 NOTE — TELEPHONE ENCOUNTER
Called patient regarding updated plan of care regarding hyperkalemia. Patient answered this AM. She confirmed that she stopped taking lisinopril and picked up Lokelma x 3. I advised her to go for repeat blood draw to assess potassium levels as this was not done on Friday as we discussed. Patient verbalized understanding and said she will come into the lab today to complete blood work. Continue to monitor.           Jose David Cartagena MD   [facility] Internal Medicine HO-1

## 2023-12-22 ENCOUNTER — OFFICE VISIT (OUTPATIENT)
Dept: CARDIOLOGY | Facility: CLINIC | Age: 73
End: 2023-12-22

## 2023-12-22 VITALS
TEMPERATURE: 98 F | BODY MASS INDEX: 20.72 KG/M2 | HEIGHT: 59 IN | RESPIRATION RATE: 20 BRPM | WEIGHT: 102.75 LBS | SYSTOLIC BLOOD PRESSURE: 132 MMHG | DIASTOLIC BLOOD PRESSURE: 80 MMHG | HEART RATE: 60 BPM | OXYGEN SATURATION: 100 %

## 2023-12-22 DIAGNOSIS — Z95.1 HX OF CABG: Primary | ICD-10-CM

## 2023-12-22 DIAGNOSIS — I10 PRIMARY HYPERTENSION: ICD-10-CM

## 2023-12-22 DIAGNOSIS — I48.0 PAF (PAROXYSMAL ATRIAL FIBRILLATION): ICD-10-CM

## 2023-12-22 DIAGNOSIS — I25.118 CORONARY ARTERY DISEASE OF NATIVE ARTERY OF NATIVE HEART WITH STABLE ANGINA PECTORIS: ICD-10-CM

## 2023-12-22 DIAGNOSIS — E78.2 MIXED HYPERLIPIDEMIA: ICD-10-CM

## 2023-12-22 PROCEDURE — 93005 ELECTROCARDIOGRAM TRACING: CPT

## 2023-12-22 PROCEDURE — 99215 OFFICE O/P EST HI 40 MIN: CPT | Mod: PBBFAC

## 2023-12-22 PROCEDURE — 99214 OFFICE O/P EST MOD 30 MIN: CPT | Mod: S$PBB,,,

## 2023-12-22 PROCEDURE — 99214 PR OFFICE/OUTPT VISIT, EST, LEVL IV, 30-39 MIN: ICD-10-PCS | Mod: S$PBB,,,

## 2023-12-22 RX ORDER — ISOSORBIDE MONONITRATE 30 MG/1
30 TABLET, EXTENDED RELEASE ORAL DAILY
Qty: 90 TABLET | Refills: 3 | Status: SHIPPED | OUTPATIENT
Start: 2023-12-22 | End: 2024-03-04 | Stop reason: HOSPADM

## 2023-12-22 RX ORDER — LISINOPRIL 20 MG/1
20 TABLET ORAL DAILY
COMMUNITY
Start: 2023-10-30 | End: 2023-12-22 | Stop reason: SDUPTHER

## 2023-12-22 RX ORDER — LISINOPRIL 20 MG/1
20 TABLET ORAL DAILY
Qty: 90 TABLET | Refills: 3 | Status: SHIPPED | OUTPATIENT
Start: 2023-12-22 | End: 2024-12-21

## 2023-12-22 NOTE — PROGRESS NOTES
CHIEF COMPLAINT:   Chief Complaint   Patient presents with    f/u denies chest pain or sob sts has a h/a all the time no                                                   HPI:  Luz Elena Saunders 73 y.o. female with CAD (s/p CABG x 3 May 2018- LIMA-LAD, SVG- OM and SVG -RCA), Atrial Fibrillation (s/p modified cryo MAZE ablation and TOBI ligation at time of CABG), HTN, hypothyroidism, migraines, neurofibromatosis, osteoporosis presents for follow up and ongoing care.  Patient completed a nuclear stress test on 10.12.22 which revealed a mild intensity, moderate sized mostly reversible defect in Lcx territory and small moderate reversible apical defect.  Patient's provider at that time felt the chest pain was non-anginal and did not feel patient needed to undergo coronary angiogram at that time. Of note, patient completed a 30 day event monitor November 2022, which did not reveal any AFIb episodes, thus Xarelto was discontinued per patients request due to bruising.  Last office visit patient stated that she was feeling well overall, she did endorse ongoing occasional BAUTISTA but stated that it was stable from prior appointment.    Today the patient states that she is feeling well overall.  She says life is good.  From a cardiac standpoint she is endorsing ongoing occasional BAUTISTA, but states that it was stable and has not progressed.  She denies any chest pain, PND, orthopnea, lightheadedness, dizziness, syncope, or claudication symptoms, or peripheral edema.  Of note, she states that she has been under more stress and has been more depressed than usual, due to the loss of her sister.  She states that this was sudden.  She states that she is able to complete her ADLs without any issues or ischemic symptoms, but does notice BAUTISTA with some of the more strenuous household chores such as making her bed and sweeping.  States that these are not limiting to her activity level.  She reports compliance with all her current medications.   She denies any tobacco or other illicit drug use.                                                                                                                                                                                                                                                                                                                                                                                                                                                                                    Nuclear Stress Test 10.12.22  Abnormal myocardial perfusion scan.  There are two significant perfusion abnormalities.  Perfusion Abnormality #1 - There is a mild intensity, moderate sized, mostly reversible perfusion abnormality with some fixed areas in the basal to distal anterolateral wall(s) in the typical distribution of the LCX territory.  Perfusion Abnormality #2 - There is a small sized, moderate intensity, reversible perfusion abnormality that is consistent with ischemia in the distal apex wall(s) in the typical distribution of the LAD territory.  There are no other significant perfusion abnormalities.  The gated perfusion images showed an ejection fraction of 77% at rest. The gated perfusion images showed an ejection fraction of 72% post stress.  The EKG portion of this study is negative for ischemia.  The patient reported no chest pain during the stress test.  There were no arrhythmias during stress.      Patient Active Problem List   Diagnosis    Coronary artery disease of native artery of native heart with stable angina pectoris    Hx of CABG    Primary hypertension    Other specified hypothyroidism    Neurofibromatosis    Migraine headache    PAF (paroxysmal atrial fibrillation)    Medication refill    Osteoporosis    Vitamin D deficiency    HLD (hyperlipidemia)     Past Surgical History:   Procedure Laterality Date    CORONARY ARTERY BYPASS GRAFT      HYSTERECTOMY       Social History  "    Socioeconomic History    Marital status:    Tobacco Use    Smoking status: Never    Smokeless tobacco: Never   Substance and Sexual Activity    Alcohol use: Not Currently     Alcohol/week: 1.0 standard drink of alcohol     Types: 1 Cans of beer per week     Comment: monthly    Drug use: Never    Sexual activity: Not Currently     Partners: Male        Family History   Problem Relation Age of Onset    Stroke Mother     Heart disease Father      Review of patient's allergies indicates:   Allergen Reactions    Contrast media     Codeine Nausea And Vomiting    Dye Dermatitis     IV contrast    Tramadol Nausea And Vomiting         ROS:  Review of Systems   Constitutional: Negative.    HENT: Negative.     Eyes: Negative.    Respiratory:  Negative for shortness of breath (Occasional).    Cardiovascular:  Negative for chest pain, palpitations, orthopnea, claudication, leg swelling and PND.   Gastrointestinal: Negative.    Genitourinary: Negative.    Musculoskeletal: Negative.    Skin: Negative.    Neurological:  Negative for headaches.   Endo/Heme/Allergies: Negative.    Psychiatric/Behavioral: Negative.                                                                                                                                                                                    PHYSICAL EXAM:  Visit Vitals  /80 (BP Location: Left arm, Patient Position: Sitting, BP Method: Small (Automatic))   Pulse 60   Temp 97.5 °F (36.4 °C) (Oral)   Resp 20   Ht 4' 11" (1.499 m)   Wt 46.6 kg (102 lb 11.8 oz)   SpO2 100%   BMI 20.75 kg/m²         Physical Exam  Constitutional:       Appearance: Normal appearance.   HENT:      Head: Normocephalic and atraumatic.      Mouth/Throat:      Mouth: Mucous membranes are moist.   Eyes:      Extraocular Movements: Extraocular movements intact.      Pupils: Pupils are equal, round, and reactive to light.   Neck:      Vascular: No carotid bruit.   Cardiovascular:      Rate and " Rhythm: Normal rate and regular rhythm.      Pulses: Normal pulses.      Heart sounds: Normal heart sounds. No murmur heard.  Pulmonary:      Effort: Pulmonary effort is normal.      Breath sounds: Normal breath sounds.   Abdominal:      Palpations: Abdomen is soft.   Musculoskeletal:         General: Normal range of motion.      Cervical back: Normal range of motion.      Right lower leg: No edema.      Left lower leg: No edema.   Skin:     General: Skin is warm and dry.   Neurological:      General: No focal deficit present.      Mental Status: She is alert and oriented to person, place, and time.   Psychiatric:         Mood and Affect: Mood normal.         Behavior: Behavior normal.         Current Outpatient Medications   Medication Instructions    alendronate (FOSAMAX) 70 mg, Oral, Every 7 days, 1 tablet per week    aspirin (ECOTRIN) 81 mg, Oral, Daily    atorvastatin (LIPITOR) 80 mg, Oral, Daily    cholecalciferol (vitamin D3) 5,000 Units, Oral, Daily    isosorbide mononitrate (IMDUR) 30 mg, Oral, Daily    levothyroxine (SYNTHROID) 75 mcg, Oral, Before breakfast    lisinopriL (PRINIVIL,ZESTRIL) 20 mg, Oral, Daily    metoprolol succinate (TOPROL-XL) 100 mg, Oral, Daily    nitroGLYCERIN (NITROSTAT) 0.4 mg, Sublingual, Every 5 min PRN    pantoprazole (PROTONIX) 40 mg, Oral, Daily    paroxetine (PAXIL) 20 mg, Oral, Daily    rizatriptan (MAXALT) 10 MG tablet TAKE ONE tablet by MOUTH as needed FOR MIGRAINE. MAY REPEAT in TWO hours IF needed. DO not exceed THREE TABLETS in 24 hours        All medications, laboratory studies, cardiac diagnostic imaging reviewed.     Lab Results   Component Value Date    LDL 80.00 09/19/2023    LDL 73.00 01/11/2023    TRIG 44 09/19/2023    TRIG 57 01/11/2023    CREATININE 1.21 (H) 09/25/2023    MG 2.0 06/02/2018    K 4.9 09/25/2023        ASSESSMENT/PLAN:  CAD (s/p CABGx3 05/18)  LIMA-LAD, SVG-OM and SVG-RCA  - Denies any ischemic symptoms today, see HPI  - Nuclear stress test showed a  mild intensity, medium sized mostly reversible Lcx defect and a small, moderate intensity reversible apical LAD defect  - Per previous note, patient's pain appears non-anginal and defects are not in large territories, will continue to monitor.  - Patient denies any recent episodes of chest pain and denies any SL nitro use  - Continue Aspirin, Lipitor, Toprol, Imdur, and SL Nitro prn chest pain   - We will continue to hold off on coronary angiogram at this time as patient has been asymptomatic ; reevaluate at follow up   - ED precautions given  - Patient advised to notify clinic for any change in symptoms     Atrial Fibrillation   Patient is s/p modified cryo-MAZE and TOBI ligation in 2018 at time of CABG  - Denies palpitations or tachycardia  - During a previous visit, patient complained of easy bruising and asked if she could stop Xarelto.    - Patient had not had evidence of AFib since his surgery in 2018.  - Long discussion was held with pt and she understood there may be a small risk of undiagnosed Afib that may potentially cause a CVA. Xarelto was stopped.  - 30 day event monitor Nov. 2022 - no evidence of AFIB  - Continue Toprol  mg daily  - Will remain off of Xarelto for now per request from patient due to complaints of bruising.  Again risks and benefits of anticoagulation were discussed inpatient would like to proceed without anticoagulation  - Auscultation today revealed normal sounding rhythm and rate     HTN  - BP at goal today 132/80  - Continue Lisinopril, Toprol XL, and Imdur   - Counseled on low salt diet and exercise as tolerated     HLD  - LDL near goal - 80 per labs September 2023   - Continue high intensity statin  - Counseled on low-cholesterol, heart healthy diet and exercise as tolerated    Abdominal Pain/Indigestion  - Was trialed on Protonix at last office visit, but states that she has no longer taking anymore  - Counseled on diet      Hypothyroidism  - Management per PCP       Follow  up in cardiology clinic in 4 months or sooner if needed  Follow up with PCP as directed   Patient advised to notify clinic for any change in symptoms

## 2024-03-04 ENCOUNTER — OFFICE VISIT (OUTPATIENT)
Dept: INTERNAL MEDICINE | Facility: CLINIC | Age: 74
End: 2024-03-04
Payer: MEDICARE

## 2024-03-04 VITALS
WEIGHT: 105.19 LBS | SYSTOLIC BLOOD PRESSURE: 133 MMHG | HEART RATE: 55 BPM | RESPIRATION RATE: 18 BRPM | DIASTOLIC BLOOD PRESSURE: 81 MMHG | TEMPERATURE: 98 F | BODY MASS INDEX: 21.25 KG/M2 | OXYGEN SATURATION: 98 %

## 2024-03-04 DIAGNOSIS — Z28.21 INFLUENZA VACCINATION DECLINED: ICD-10-CM

## 2024-03-04 DIAGNOSIS — E55.9 VITAMIN D DEFICIENCY: ICD-10-CM

## 2024-03-04 DIAGNOSIS — R51.9 NONINTRACTABLE HEADACHE, UNSPECIFIED CHRONICITY PATTERN, UNSPECIFIED HEADACHE TYPE: ICD-10-CM

## 2024-03-04 DIAGNOSIS — E03.9 HYPOTHYROIDISM, UNSPECIFIED TYPE: ICD-10-CM

## 2024-03-04 DIAGNOSIS — I10 PRIMARY HYPERTENSION: ICD-10-CM

## 2024-03-04 DIAGNOSIS — G43.909 MIGRAINE WITHOUT STATUS MIGRAINOSUS, NOT INTRACTABLE, UNSPECIFIED MIGRAINE TYPE: ICD-10-CM

## 2024-03-04 DIAGNOSIS — R79.89 ELEVATED SERUM CREATININE: ICD-10-CM

## 2024-03-04 DIAGNOSIS — E87.5 HYPERKALEMIA: ICD-10-CM

## 2024-03-04 DIAGNOSIS — Z00.00 HEALTHCARE MAINTENANCE: Primary | ICD-10-CM

## 2024-03-04 DIAGNOSIS — E78.5 HYPERLIPIDEMIA, UNSPECIFIED HYPERLIPIDEMIA TYPE: ICD-10-CM

## 2024-03-04 DIAGNOSIS — M81.0 OSTEOPOROSIS, UNSPECIFIED OSTEOPOROSIS TYPE, UNSPECIFIED PATHOLOGICAL FRACTURE PRESENCE: ICD-10-CM

## 2024-03-04 DIAGNOSIS — I48.0 PAF (PAROXYSMAL ATRIAL FIBRILLATION): ICD-10-CM

## 2024-03-04 DIAGNOSIS — Z76.0 MEDICATION REFILL: ICD-10-CM

## 2024-03-04 DIAGNOSIS — Z86.69 HISTORY OF MIGRAINE: ICD-10-CM

## 2024-03-04 DIAGNOSIS — I25.118 CORONARY ARTERY DISEASE OF NATIVE ARTERY OF NATIVE HEART WITH STABLE ANGINA PECTORIS: ICD-10-CM

## 2024-03-04 DIAGNOSIS — Z23 NEED FOR VACCINATION: ICD-10-CM

## 2024-03-04 DIAGNOSIS — Z95.1 HX OF CABG: ICD-10-CM

## 2024-03-04 PROCEDURE — 90471 IMMUNIZATION ADMIN: CPT | Mod: PBBFAC

## 2024-03-04 PROCEDURE — 90750 HZV VACC RECOMBINANT IM: CPT | Mod: PBBFAC

## 2024-03-04 PROCEDURE — 99213 OFFICE O/P EST LOW 20 MIN: CPT | Mod: PBBFAC,25

## 2024-03-04 RX ORDER — NITROGLYCERIN 0.4 MG/1
0.4 TABLET SUBLINGUAL EVERY 5 MIN PRN
Qty: 30 TABLET | Refills: 3 | Status: SHIPPED | OUTPATIENT
Start: 2024-03-04 | End: 2025-03-04

## 2024-03-04 RX ORDER — PAROXETINE HYDROCHLORIDE 20 MG/1
20 TABLET, FILM COATED ORAL DAILY
Qty: 90 TABLET | Refills: 2 | Status: SHIPPED | OUTPATIENT
Start: 2024-03-04

## 2024-03-04 RX ORDER — ASPIRIN 81 MG/1
81 TABLET ORAL DAILY
Qty: 90 TABLET | Refills: 3 | Status: SHIPPED | OUTPATIENT
Start: 2024-03-04

## 2024-03-04 RX ORDER — EZETIMIBE 10 MG/1
10 TABLET ORAL DAILY
Qty: 90 TABLET | Refills: 3 | Status: SHIPPED | OUTPATIENT
Start: 2024-03-04 | End: 2025-03-04

## 2024-03-04 RX ORDER — ATORVASTATIN CALCIUM 80 MG/1
80 TABLET, FILM COATED ORAL DAILY
Qty: 90 TABLET | Refills: 3 | Status: SHIPPED | OUTPATIENT
Start: 2024-03-04

## 2024-03-04 RX ORDER — RIZATRIPTAN BENZOATE 10 MG/1
TABLET ORAL
Qty: 15 TABLET | Refills: 6 | Status: SHIPPED | OUTPATIENT
Start: 2024-03-04

## 2024-03-04 RX ORDER — ACETAMINOPHEN 500 MG
5000 TABLET ORAL DAILY
Qty: 90 TABLET | Refills: 3 | Status: SHIPPED | OUTPATIENT
Start: 2024-03-04 | End: 2025-02-27

## 2024-03-04 RX ORDER — ALENDRONATE SODIUM 70 MG/1
70 TABLET ORAL
Qty: 12 TABLET | Refills: 3 | Status: SHIPPED | OUTPATIENT
Start: 2024-03-04

## 2024-03-04 RX ORDER — LEVOTHYROXINE SODIUM 75 UG/1
75 TABLET ORAL
Qty: 90 TABLET | Refills: 3 | Status: SHIPPED | OUTPATIENT
Start: 2024-03-04

## 2024-03-04 RX ORDER — METOPROLOL SUCCINATE 100 MG/1
100 TABLET, EXTENDED RELEASE ORAL DAILY
Qty: 90 TABLET | Refills: 3 | Status: SHIPPED | OUTPATIENT
Start: 2024-03-04

## 2024-03-04 RX ADMIN — ZOSTER VACCINE RECOMBINANT, ADJUVANTED 0.5 ML: KIT at 09:03

## 2024-03-04 NOTE — PROGRESS NOTES
IM Clinic Note    Patient Name: Luz Elena Saunders  YOB: 1950   MRN: [id number]  Date: 03/04/2024  Home Address: [address] [place] [address]      Subjective:     Chief Complaint:   Chief Complaint   Patient presents with    Follow-up     Medication Refills- atorvastatin, lisinopril, paroxetine and nitro.       HPI:  Luz Elena Saunders is a 73 y.o. year old female with hx of CAD (s/p CABG x 3 May 2018- LIMA-LAD, SVG- OM and SVG -RCA), Atrial Fibrillation (s/p modified cryo MAZE ablation and TOBI ligation at time of CABG), HTN, hypothyroidism, migraines, neurofibromatosis, osteoporosis who presents to clinic today for routine follow-up. Since last visit she reports continued HA, that are now affecting daily life. Patient still on Imdur despite cardiology recommendations to discontinue Imdur, patient resistant. Denies chest pain and shortness of breath. Not interested in Flu vaccination today or previously ordered mammogram, states she has Cologuard at home but not completed yet. Denies family history of colon ca. Patient insists of no previous history of tobacco product use.     Available notes and lab results since last visit were reviewed; Patient was last seen in clinic on 9/19/23.     Care Team   [facility] - last seen 12/22/23., scheduled to follow-up on 4/26/24.  No other changes. Hx of abnormal stress test. Declined [facility], held off due to asymptomatic at the time of last f/u.     Past Medical History:   Diagnosis Date    Carotid artery occlusion     Hypertension     Migraine     Neurofibromatosis         Past Surgical History:   Procedure Laterality Date    CORONARY ARTERY BYPASS GRAFT      HYSTERECTOMY         Allergy:  Review of patient's allergies indicates:   Allergen Reactions    Contrast media     Codeine Nausea And Vomiting    Dye Dermatitis     IV contrast    Tramadol Nausea And Vomiting        Current Medications:    Current Outpatient Medications:     alendronate (FOSAMAX) 70 MG tablet,  Take 1 tablet (70 mg total) by mouth every 7 days. 1 tablet per week, Disp: 12 tablet, Rfl: 3    aspirin (ECOTRIN) 81 MG EC tablet, Take 1 tablet (81 mg total) by mouth once daily., Disp: 90 tablet, Rfl: 3    atorvastatin (LIPITOR) 80 MG tablet, Take 1 tablet (80 mg total) by mouth once daily., Disp: 90 tablet, Rfl: 3    cholecalciferol, vitamin D3, 125 mcg (5,000 unit) Tab, Take 5,000 Units by mouth once daily., Disp: , Rfl:     isosorbide mononitrate (IMDUR) 30 MG 24 hr tablet, Take 1 tablet (30 mg total) by mouth once daily., Disp: 90 tablet, Rfl: 3    levothyroxine (SYNTHROID) 75 MCG tablet, Take 1 tablet (75 mcg total) by mouth before breakfast., Disp: 90 tablet, Rfl: 3    lisinopriL (PRINIVIL,ZESTRIL) 20 MG tablet, Take 1 tablet (20 mg total) by mouth once daily., Disp: 90 tablet, Rfl: 3    metoprolol succinate (TOPROL-XL) 100 MG 24 hr tablet, Take 1 tablet (100 mg total) by mouth once daily., Disp: 90 tablet, Rfl: 3    nitroGLYCERIN (NITROSTAT) 0.4 MG SL tablet, Place 1 tablet (0.4 mg total) under the tongue every 5 (five) minutes as needed for Chest pain., Disp: 30 tablet, Rfl: 3    pantoprazole (PROTONIX) 40 MG tablet, Take 1 tablet (40 mg total) by mouth once daily., Disp: 30 tablet, Rfl: 11    paroxetine (PAXIL) 20 MG tablet, Take 1 tablet (20 mg total) by mouth once daily., Disp: 90 tablet, Rfl: 2    rizatriptan (MAXALT) 10 MG tablet, TAKE ONE tablet by MOUTH as needed FOR MIGRAINE. MAY REPEAT in TWO hours IF needed. DO not exceed THREE TABLETS in 24 hours, Disp: 15 tablet, Rfl: 6     Social History:   reports that she has never smoked. She has never used smokeless tobacco. She reports that she does not currently use alcohol after a past usage of about 1.0 standard drink of alcohol per week. She reports that she does not use drugs.   Patient confirms that she has never smoked cigarettes.   Lives with boyfriend for the last 6 years. Supportive home.     Family History   Problem Relation Age of Onset     Stroke Mother     Heart disease Father    Older sister  due to MI     Immunization History   Administered Date(s) Administered    COVID-19, MRNA, LN-S, PF (Pfizer) (Purple Cap) 2021, 2021    Influenza - High Dose - PF (65 years and older) 12/10/2020, 2022    Influenza - Trivalent (ADULT) 2017, 2019    Pneumococcal Conjugate - 13 Valent 2019    Pneumococcal Polysaccharide - 23 Valent 12/10/2020    Tdap 2023    Zoster Recombinant 2023       Review of Systems   Constitutional:  Negative for chills, fever and weight loss.   Eyes:  Negative for blurred vision and double vision.   Respiratory:  Negative for cough and shortness of breath.    Cardiovascular:  Negative for chest pain, palpitations and leg swelling.   Gastrointestinal:  Negative for abdominal pain, blood in stool, heartburn, melena, nausea and vomiting.   Musculoskeletal:  Negative for falls.   Neurological:  Positive for headaches. Negative for dizziness, focal weakness, seizures, loss of consciousness and weakness.   Endo/Heme/Allergies:  Negative for polydipsia.   Psychiatric/Behavioral:  Negative for depression and substance abuse. The patient is not nervous/anxious and does not have insomnia.        Objective:      Physical Exam  Vitals:    24 0857   BP: 133/81   BP Location: Left arm   Patient Position: Sitting   BP Method: Small (Automatic)   Pulse: (!) 55   Resp: 18   Temp: 98.2 °F (36.8 °C)   TempSrc: Oral   SpO2: 98%   Weight: 47.7 kg (105 lb 3.2 oz)        Wt Readings from Last 3 Encounters:   24 47.7 kg (105 lb 3.2 oz)   23 46.6 kg (102 lb 11.8 oz)   23 46.3 kg (102 lb)       Physical Exam  Vitals and nursing note reviewed.   Constitutional:       General: She is not in acute distress.     Appearance: She is not ill-appearing.   Neck:      Vascular: No carotid bruit.      Comments: No JVD  Cardiovascular:      Rate and Rhythm: Regular rhythm. Bradycardia present.       Heart sounds: Murmur heard.   Pulmonary:      Effort: Pulmonary effort is normal. No respiratory distress.      Breath sounds: No wheezing.      Comments: No pulmonary crackles  Musculoskeletal:      Cervical back: Normal range of motion.      Right lower leg: No edema.      Left lower leg: No edema.   Skin:     General: Skin is warm and dry.      Capillary Refill: Capillary refill takes less than 2 seconds.      Coloration: Skin is pale.      Findings: Lesion present. No bruising.      Comments: +Neurofibromas throughout skin, chronic appearing    Neurological:      Mental Status: She is alert and oriented to person, place, and time. Mental status is at baseline.      Motor: No weakness.          Body mass index is 21.25 kg/m².      Lab Visit on 09/25/2023   Component Date Value Ref Range Status    Sodium Level 09/25/2023 142  136 - 145 mmol/L Final    Potassium Level 09/25/2023 4.9  3.5 - 5.1 mmol/L Final    Chloride 09/25/2023 110 (H)  98 - 107 mmol/L Final    Carbon Dioxide 09/25/2023 23  23 - 31 mmol/L Final    Glucose Level 09/25/2023 80 (L)  82 - 115 mg/dL Final    Blood Urea Nitrogen 09/25/2023 24.1 (H)  9.8 - 20.1 mg/dL Final    Creatinine 09/25/2023 1.21 (H)  0.55 - 1.02 mg/dL Final    BUN/Creatinine Ratio 09/25/2023 20   Final    Calcium Level Total 09/25/2023 8.6  8.4 - 10.2 mg/dL Final    Anion Gap 09/25/2023 9.0  mEq/L Final    eGFR 09/25/2023 47  mls/min/1.73/m2 Final   Lab Visit on 09/19/2023   Component Date Value Ref Range Status    Sodium Level 09/19/2023 138  136 - 145 mmol/L Final    Potassium Level 09/19/2023 5.5 (H)  3.5 - 5.1 mmol/L Final    Chloride 09/19/2023 107  98 - 107 mmol/L Final    Carbon Dioxide 09/19/2023 24  23 - 31 mmol/L Final    Glucose Level 09/19/2023 86  82 - 115 mg/dL Final    Blood Urea Nitrogen 09/19/2023 31.8 (H)  9.8 - 20.1 mg/dL Final    Creatinine 09/19/2023 1.37 (H)  0.55 - 1.02 mg/dL Final    Calcium Level Total 09/19/2023 9.4  8.4 - 10.2 mg/dL Final    Protein  Total 09/19/2023 7.1  5.8 - 7.6 gm/dL Final    Albumin Level 09/19/2023 3.7  3.4 - 4.8 g/dL Final    Globulin 09/19/2023 3.4  2.4 - 3.5 gm/dL Final    Albumin/Globulin Ratio 09/19/2023 1.1  1.1 - 2.0 ratio Final    Bilirubin Total 09/19/2023 0.4  <=1.5 mg/dL Final    Alkaline Phosphatase 09/19/2023 38 (L)  40 - 150 unit/L Final    Alanine Aminotransferase 09/19/2023 9  0 - 55 unit/L Final    Aspartate Aminotransferase 09/19/2023 16  5 - 34 unit/L Final    eGFR 09/19/2023 41  mls/min/1.73/m2 Final    Hemoglobin A1c 09/19/2023 5.4  <=7.0 % Final    Estimated Average Glucose 09/19/2023 108.3  mg/dL Final    TSH 09/19/2023 1.037  0.350 - 4.940 uIU/mL Final    Thyroxine Free 09/19/2023 1.18  0.70 - 1.48 ng/dL Final    Vit D 25 OH 09/19/2023 26.1 (L)  30.0 - 80.0 ng/mL Final    Cholesterol Total 09/19/2023 139  <=200 mg/dL Final    HDL Cholesterol 09/19/2023 50  35 - 60 mg/dL Final    Triglyceride 09/19/2023 44  37 - 140 mg/dL Final    Cholesterol/HDL Ratio 09/19/2023 3  0 - 5 Final    Very Low Density Lipoprotein 09/19/2023 9   Final    LDL Cholesterol 09/19/2023 80.00  50.00 - 140.00 mg/dL Final    WBC 09/19/2023 7.28  4.50 - 11.50 x10(3)/mcL Final    RBC 09/19/2023 4.28  4.20 - 5.40 x10(6)/mcL Final    Hgb 09/19/2023 12.4  12.0 - 16.0 g/dL Final    Hct 09/19/2023 38.7  37.0 - 47.0 % Final    MCV 09/19/2023 90.4  80.0 - 94.0 fL Final    MCH 09/19/2023 29.0  27.0 - 31.0 pg Final    MCHC 09/19/2023 32.0 (L)  33.0 - 36.0 g/dL Final    RDW 09/19/2023 15.4  11.5 - 17.0 % Final    Platelet 09/19/2023 236  130 - 400 x10(3)/mcL Final    MPV 09/19/2023 11.7 (H)  7.4 - 10.4 fL Final    Neut % 09/19/2023 61.6  % Final    Lymph % 09/19/2023 24.5  % Final    Mono % 09/19/2023 9.8  % Final    Eos % 09/19/2023 2.6  % Final    Basophil % 09/19/2023 1.2  % Final    Lymph # 09/19/2023 1.78  0.6 - 4.6 x10(3)/mcL Final    Neut # 09/19/2023 4.49  2.1 - 9.2 x10(3)/mcL Final    Mono # 09/19/2023 0.71  0.1 - 1.3 x10(3)/mcL Final    Eos #  09/19/2023 0.19  0 - 0.9 x10(3)/mcL Final    Baso # 09/19/2023 0.09  <=0.2 x10(3)/mcL Final    IG# 09/19/2023 0.02  0 - 0.04 x10(3)/mcL Final    IG% 09/19/2023 0.3  % Final    NRBC% 09/19/2023 0.0  % Final       Results for orders placed during the hospital encounter of 10/12/22     Nuclear Stress - Cardiology Interpreted     Interpretation Summary    Abnormal myocardial perfusion scan.    There are two significant perfusion abnormalities.    Perfusion Abnormality #1 - There is a mild intensity, moderate sized, mostly reversible perfusion abnormality with some fixed areas in the basal to distal anterolateral wall(s) in the typical distribution of the LCX territory.    Perfusion Abnormality #2 - There is a small sized, moderate intensity, reversible perfusion abnormality that is consistent with ischemia in the distal apex wall(s) in the typical distribution of the LAD territory.    There are no other significant perfusion abnormalities.    The gated perfusion images showed an ejection fraction of 77% at rest. The gated perfusion images showed an ejection fraction of 72% post stress.    The EKG portion of this study is negative for ischemia.    The patient reported no chest pain during the stress test.    There were no arrhythmias during stress.        Echocardiogram from 1/27/2021:  Normal LV size and function  LVEF 55%  Mild MR  Mild AV sclerosis  Mild TR, RVSP 35  Moderate dilated left atrium  Known PFO with left-to-right shunt  IVC dilated     48-hour holter monitor: 2/10/2021:   Sinus rhythm with average heart rate 73 bpm, no episodes of tachycardia, no episodes of bradycardia  Short burst of SVT was 5 beats at 122 bpm  Longest V beat was 3 beats at 97 bpm   No diary returned    Assessment:       Plan  Need for vaccination  Healthcare maintenance  Influenza Vaccine Declined   - Zoster Recombinant Vaccine Dose 2/2 for in clinic administration today, will be UTD on vaccinations after this   -Discussed importance  and risks vs benefits of Flu Vaccination x 3, patient verbalized understanding and declines at this time. Will revisit at next clinic appointment  -Patient also not interested in completing previously ordered mammogram, described risks vs benefits, patient verbalized understanding.   -Reminded patient to complete cologuard, provided instruction brochure to take home during this visit as well     Hx of PAF (paroxysmal atrial fibrillation) s/p modified cryo MAZE ablation   Coronary artery disease of native artery of native heart with stable angina pectoris s/p CABG x 3   HTN  Elevated Cr  Hyperkalemia   Hx of Migraines   HA   -Discussed concerning abnormal nuclear stress test given significant PMHx as well as Fhx, highly recommended previously recommended LHC per [facility] despite being largely asymptomatic. Patient verbalized understanding, has agreed to discuss indication at next cardiology appointment. Continue to monitor.   -LDL 80 on 9/19/23, above goal, patient is already maximum Lipitor, will add Zetia 10mg qd and repeat lipid panel in 3 months, order in place   -Repeat CMP due to significant hyperkalemia at last draw requiring Lokelma x 3 doses prescription   -Ordered UA and P:C ratio   -Refilled ASA 81qd, Toprol 100mg qd, and Lipitor 80mg qd   -Continue Lisinopril 20mg qd   -Discontinued scheduled Imdur due to HA occurrence per [facility] recommendation at last clinic appointment, refilled PRN SL Nitroglycerin at home in the event of chest pain   -Patient verbalized understanding PRN q5min x 3 dosing and then plan to present to ED in event of continued chest pain   -Refilled rescue Rizatriptan as she has reported this works for her in the past, patient verbalized understanding that this could be a risk due to hx of CAD and CABG and would like to refill medication regardless   -Continue Protonix 40mg qd and Paxil 20mg qd   -Counseled patient on avoidance of NSAIDs   -Recommended healthy lifestyle  modifications including low Na, low cholesterol diet and physical activity 30min per day x 5 days per week as tolerated     Hypothyroidism   -Refilled Synthroid 75mcg qAM   -Repeat TSH and free T4 wnl    Osteoporosis   Vit D Deficiency  -Last DEXA scan 4/15/22 significant for severe osteoporosis of lumbar spine and femurs   Vit D on 9/19/23 26.1  -Refilled Fosamax 70mg qweekly, start date:9/19/22, end date:9/19/27  -Refilled daily Vit D supplement     Strict ED precautions given for chest pain and HA due to extensive hx of CAD and declined repeat [facility]         Patient case and plan of care discussed with Dr. Shayan Leavitt     Disposition: Complete lab work today, RTC in 6 months or can call in for sooner appointment if needed     Jose David Cartagena MD   Internal Medicine - -1

## 2024-03-11 NOTE — PROGRESS NOTES
I have reviewed and concur with the resident's history, physical, assessment, and plan.  I have discussed with him all issues related to the diagnosis, workup and treatment plan. Care provided as reasonable and necessary.PAF.S/P Maze.CABG stable    Shayan Leavitt MD  [facility]

## 2024-09-13 RX ORDER — ISOSORBIDE MONONITRATE 30 MG/1
30 TABLET, EXTENDED RELEASE ORAL DAILY
COMMUNITY
Start: 2024-05-01

## 2024-09-16 ENCOUNTER — OFFICE VISIT (OUTPATIENT)
Dept: INTERNAL MEDICINE | Facility: CLINIC | Age: 74
End: 2024-09-16
Payer: MEDICARE

## 2024-09-16 ENCOUNTER — LAB VISIT (OUTPATIENT)
Dept: LAB | Facility: HOSPITAL | Age: 74
End: 2024-09-16
Payer: MEDICARE

## 2024-09-16 VITALS
BODY MASS INDEX: 22.06 KG/M2 | WEIGHT: 109.19 LBS | SYSTOLIC BLOOD PRESSURE: 138 MMHG | RESPIRATION RATE: 20 BRPM | TEMPERATURE: 99 F | HEART RATE: 67 BPM | OXYGEN SATURATION: 97 % | DIASTOLIC BLOOD PRESSURE: 89 MMHG

## 2024-09-16 DIAGNOSIS — Z28.21 INFLUENZA VACCINATION DECLINED: ICD-10-CM

## 2024-09-16 DIAGNOSIS — M81.0 OSTEOPOROSIS, UNSPECIFIED OSTEOPOROSIS TYPE, UNSPECIFIED PATHOLOGICAL FRACTURE PRESENCE: ICD-10-CM

## 2024-09-16 DIAGNOSIS — Z53.20 PROCEDURE REFUSED: ICD-10-CM

## 2024-09-16 DIAGNOSIS — Z00.00 HEALTHCARE MAINTENANCE: ICD-10-CM

## 2024-09-16 DIAGNOSIS — E78.5 HYPERLIPIDEMIA, UNSPECIFIED HYPERLIPIDEMIA TYPE: ICD-10-CM

## 2024-09-16 DIAGNOSIS — G43.909 MIGRAINE WITHOUT STATUS MIGRAINOSUS, NOT INTRACTABLE, UNSPECIFIED MIGRAINE TYPE: ICD-10-CM

## 2024-09-16 DIAGNOSIS — Z76.0 MEDICATION REFILL: ICD-10-CM

## 2024-09-16 DIAGNOSIS — R79.89 ELEVATED SERUM CREATININE: ICD-10-CM

## 2024-09-16 DIAGNOSIS — I10 PRIMARY HYPERTENSION: Primary | ICD-10-CM

## 2024-09-16 DIAGNOSIS — I10 PRIMARY HYPERTENSION: ICD-10-CM

## 2024-09-16 LAB
ALBUMIN SERPL-MCNC: 3.8 G/DL (ref 3.4–4.8)
ALBUMIN/GLOB SERPL: 1.1 RATIO (ref 1.1–2)
ALP SERPL-CCNC: 45 UNIT/L (ref 40–150)
ALT SERPL-CCNC: 8 UNIT/L (ref 0–55)
ANION GAP SERPL CALC-SCNC: 9 MEQ/L
AST SERPL-CCNC: 21 UNIT/L (ref 5–34)
BILIRUB SERPL-MCNC: 0.6 MG/DL
BUN SERPL-MCNC: 22 MG/DL (ref 9.8–20.1)
CALCIUM SERPL-MCNC: 9.5 MG/DL (ref 8.4–10.2)
CHLORIDE SERPL-SCNC: 106 MMOL/L (ref 98–107)
CHOLEST SERPL-MCNC: 170 MG/DL
CHOLEST/HDLC SERPL: 4 {RATIO} (ref 0–5)
CO2 SERPL-SCNC: 25 MMOL/L (ref 23–31)
CREAT SERPL-MCNC: 1.14 MG/DL (ref 0.55–1.02)
CREAT UR-MCNC: 122.8 MG/DL (ref 45–106)
CREAT/UREA NIT SERPL: 19
GFR SERPLBLD CREATININE-BSD FMLA CKD-EPI: 51 ML/MIN/1.73/M2
GLOBULIN SER-MCNC: 3.4 GM/DL (ref 2.4–3.5)
GLUCOSE SERPL-MCNC: 92 MG/DL (ref 82–115)
HDLC SERPL-MCNC: 48 MG/DL (ref 35–60)
LDLC SERPL CALC-MCNC: 105 MG/DL (ref 50–140)
POTASSIUM SERPL-SCNC: 4.5 MMOL/L (ref 3.5–5.1)
PROT SERPL-MCNC: 7.2 GM/DL (ref 5.8–7.6)
PROT UR STRIP-MCNC: <6.8 MG/DL
SODIUM SERPL-SCNC: 140 MMOL/L (ref 136–145)
TRIGL SERPL-MCNC: 86 MG/DL (ref 37–140)
VLDLC SERPL CALC-MCNC: 17 MG/DL

## 2024-09-16 PROCEDURE — 80061 LIPID PANEL: CPT

## 2024-09-16 PROCEDURE — 80053 COMPREHEN METABOLIC PANEL: CPT

## 2024-09-16 PROCEDURE — 99214 OFFICE O/P EST MOD 30 MIN: CPT | Mod: PBBFAC

## 2024-09-16 PROCEDURE — 82570 ASSAY OF URINE CREATININE: CPT

## 2024-09-16 PROCEDURE — 36415 COLL VENOUS BLD VENIPUNCTURE: CPT

## 2024-09-16 PROCEDURE — 84156 ASSAY OF PROTEIN URINE: CPT

## 2024-09-16 RX ORDER — PANTOPRAZOLE SODIUM 40 MG/1
40 TABLET, DELAYED RELEASE ORAL DAILY
Qty: 90 TABLET | Refills: 3 | Status: SHIPPED | OUTPATIENT
Start: 2024-09-16 | End: 2025-09-16

## 2024-09-16 RX ORDER — PAROXETINE HYDROCHLORIDE 20 MG/1
20 TABLET, FILM COATED ORAL DAILY
Qty: 90 TABLET | Refills: 3 | Status: SHIPPED | OUTPATIENT
Start: 2024-09-16

## 2024-09-16 RX ORDER — RIZATRIPTAN BENZOATE 10 MG/1
TABLET ORAL
Qty: 15 TABLET | Refills: 6 | Status: SHIPPED | OUTPATIENT
Start: 2024-09-16

## 2024-09-16 RX ORDER — LISINOPRIL 20 MG/1
20 TABLET ORAL DAILY
Qty: 90 TABLET | Refills: 3 | Status: SHIPPED | OUTPATIENT
Start: 2024-09-16 | End: 2025-09-16

## 2024-09-16 NOTE — PROGRESS NOTES
Attending Physician Addendum  Management and plan discussed with resident at time of clinic visit. Care was reasonable and  necessary. Routine follow up.  I have seen and discussed care with the patient

## 2024-09-16 NOTE — PROGRESS NOTES
"  IM Clinic Note    Patient Name: Luz Elena Saunders  YOB: 1950   MRN: [id number]  Date: 09/16/2024  Home Address: [address] [place] [address]      Subjective:     Chief Complaint:   Chief Complaint   Patient presents with    Follow-up     Bad Headache this AM       HPI:  Luz Elena Saunders is a 73 y.o. year old female with hx of CAD (s/p CABG x 3 May 2018- LIMA-LAD, SVG- OM and SVG -RCA), Atrial Fibrillation (s/p modified cryo MAZE ablation and TOBI ligation at time of CABG), HTN, hypothyroidism, migraines, neurofibromatosis, osteoporosis who presents to clinic today for routine follow-up. Since last visit she reports continued HA, but current medication helps. Patient still on Imdur despite cardiology recommendations to discontinue Imdur, Patient brought medications into clinic today however lisinopril is missing from regimen. Denies chest pain,  shortness of breath, trouble breathing, dyspnea on exertion, changes in vision, and focal neurological deficit. Not interested in Flu vaccination today as well as previously ordered mammogram and cologuard. Patient reported "she does not want to know" when asked why she declined screening assessments. Denies family history of colon ca. Patient insists of no previous history of tobacco product use.     Available notes and lab results since last visit were reviewed; Patient was last seen in clinic on 3/4/24. Has not completed repeat lab work ordered at previous clinic appointment.     Care Team   [facility] - Follows for CAD, pAF, and HTN. Last seen 12/22/23, no show on 4/26/24 without follow-up reschedule.  No other changes. Hx of abnormal stress test. Declined LHC and Xarelto, held off due to asymptomatic at the time of last f/u.     Past Medical History:   Diagnosis Date    Carotid artery occlusion     Hypertension     Migraine     Neurofibromatosis         Past Surgical History:   Procedure Laterality Date    CORONARY ARTERY BYPASS GRAFT      HYSTERECTOMY   "       Allergy:  Review of patient's allergies indicates:   Allergen Reactions    Contrast media     Codeine Nausea And Vomiting    Dye Dermatitis     IV contrast    Tramadol Nausea And Vomiting        Current Medications:    Current Outpatient Medications:     alendronate (FOSAMAX) 70 MG tablet, Take 1 tablet (70 mg total) by mouth every 7 days. 1 tablet per week, Disp: 12 tablet, Rfl: 3    aspirin (ECOTRIN) 81 MG EC tablet, Take 1 tablet (81 mg total) by mouth once daily., Disp: 90 tablet, Rfl: 3    atorvastatin (LIPITOR) 80 MG tablet, Take 1 tablet (80 mg total) by mouth once daily., Disp: 90 tablet, Rfl: 3    cholecalciferol, vitamin D3, 125 mcg (5,000 unit) Tab, Take 1 tablet (5,000 Units total) by mouth once daily., Disp: 90 tablet, Rfl: 3    ezetimibe (ZETIA) 10 mg tablet, Take 1 tablet (10 mg total) by mouth once daily., Disp: 90 tablet, Rfl: 3    isosorbide mononitrate (IMDUR) 30 MG 24 hr tablet, Take 30 mg by mouth once daily., Disp: , Rfl:     levothyroxine (SYNTHROID) 75 MCG tablet, Take 1 tablet (75 mcg total) by mouth before breakfast., Disp: 90 tablet, Rfl: 3    metoprolol succinate (TOPROL-XL) 100 MG 24 hr tablet, Take 1 tablet (100 mg total) by mouth once daily., Disp: 90 tablet, Rfl: 3    nitroGLYCERIN (NITROSTAT) 0.4 MG SL tablet, Place 1 tablet (0.4 mg total) under the tongue every 5 (five) minutes as needed for Chest pain., Disp: 30 tablet, Rfl: 3    lisinopriL (PRINIVIL,ZESTRIL) 20 MG tablet, Take 1 tablet (20 mg total) by mouth once daily., Disp: 90 tablet, Rfl: 3    pantoprazole (PROTONIX) 40 MG tablet, Take 1 tablet (40 mg total) by mouth once daily., Disp: 90 tablet, Rfl: 3    paroxetine (PAXIL) 20 MG tablet, Take 1 tablet (20 mg total) by mouth once daily., Disp: 90 tablet, Rfl: 3    rizatriptan (MAXALT) 10 MG tablet, TAKE ONE tablet by MOUTH as needed FOR MIGRAINE. MAY REPEAT in TWO hours IF needed. DO not exceed THREE TABLETS in 24 hours, Disp: 15 tablet, Rfl: 6     Social History:    reports that she has never smoked. She has never used smokeless tobacco. She reports that she does not currently use alcohol after a past usage of about 1.0 standard drink of alcohol per week. She reports that she does not use drugs.   Patient confirms that she has never smoked cigarettes.   Lives with boyfriend for the last 6 years. Supportive home.     Family History   Problem Relation Name Age of Onset    Stroke Mother      Heart disease Father     Older sister  due to MI     Immunization History   Administered Date(s) Administered    COVID-19, MRNA, LN-S, PF (Pfizer) (Purple Cap) 2021, 2021    Influenza - High Dose - PF (65 years and older) 12/10/2020, 2022    Influenza - Trivalent (ADULT) 2017, 2019    Pneumococcal Conjugate - 13 Valent 2019    Pneumococcal Polysaccharide - 23 Valent 12/10/2020    Tdap 2023    Zoster Recombinant 2023, 2024       Review of Systems   Constitutional:  Negative for chills, fever and weight loss.   Eyes:  Negative for blurred vision and double vision.   Respiratory:  Negative for cough and shortness of breath.    Cardiovascular:  Negative for chest pain, palpitations and leg swelling.   Gastrointestinal:  Negative for abdominal pain, blood in stool, heartburn, melena, nausea and vomiting.   Musculoskeletal:  Negative for falls.   Neurological:  Positive for headaches. Negative for dizziness, focal weakness, seizures, loss of consciousness and weakness.   Endo/Heme/Allergies:  Negative for polydipsia.   Psychiatric/Behavioral:  Negative for depression and substance abuse. The patient is not nervous/anxious and does not have insomnia.        Objective:      Physical Exam  Vitals:    24 0923   BP: 138/89   BP Location: Left arm   Patient Position: Sitting   BP Method: Small (Automatic)   Pulse: 67   Resp: 20   Temp: 98.8 °F (37.1 °C)   TempSrc: Oral   SpO2: 97%   Weight: 49.5 kg (109 lb 3.2 oz)        Wt Readings from  Last 3 Encounters:   09/16/24 49.5 kg (109 lb 3.2 oz)   03/04/24 47.7 kg (105 lb 3.2 oz)   12/22/23 46.6 kg (102 lb 11.8 oz)       Physical Exam  Vitals and nursing note reviewed.   Constitutional:       General: She is not in acute distress.     Appearance: She is not ill-appearing.   Neck:      Vascular: No carotid bruit.      Comments: No JVD  Cardiovascular:      Rate and Rhythm: Normal rate and regular rhythm.      Heart sounds: Murmur heard.   Pulmonary:      Effort: Pulmonary effort is normal. No respiratory distress.      Breath sounds: No wheezing.      Comments: No pulmonary crackles  Musculoskeletal:      Cervical back: Normal range of motion.      Right lower leg: No edema.      Left lower leg: No edema.   Skin:     General: Skin is warm and dry.      Capillary Refill: Capillary refill takes less than 2 seconds.      Coloration: Skin is pale.      Findings: Lesion present. No bruising.      Comments: +Neurofibromas throughout skin, chronic appearing    Neurological:      Mental Status: She is alert and oriented to person, place, and time. Mental status is at baseline.      Motor: No weakness.          Body mass index is 22.06 kg/m².      No visits with results within 6 Month(s) from this visit.   Latest known visit with results is:   Lab Visit on 09/25/2023   Component Date Value Ref Range Status    Sodium 09/25/2023 142  136 - 145 mmol/L Final    Potassium 09/25/2023 4.9  3.5 - 5.1 mmol/L Final    Chloride 09/25/2023 110 (H)  98 - 107 mmol/L Final    CO2 09/25/2023 23  23 - 31 mmol/L Final    Glucose 09/25/2023 80 (L)  82 - 115 mg/dL Final    Blood Urea Nitrogen 09/25/2023 24.1 (H)  9.8 - 20.1 mg/dL Final    Creatinine 09/25/2023 1.21 (H)  0.55 - 1.02 mg/dL Final    BUN/Creatinine Ratio 09/25/2023 20   Final    Calcium 09/25/2023 8.6  8.4 - 10.2 mg/dL Final    Anion Gap 09/25/2023 9.0  mEq/L Final    eGFR 09/25/2023 47  mls/min/1.73/m2 Final       Results for orders placed during the hospital encounter  of 10/12/22     Nuclear Stress - Cardiology Interpreted     Interpretation Summary    Abnormal myocardial perfusion scan.    There are two significant perfusion abnormalities.    Perfusion Abnormality #1 - There is a mild intensity, moderate sized, mostly reversible perfusion abnormality with some fixed areas in the basal to distal anterolateral wall(s) in the typical distribution of the LCX territory.    Perfusion Abnormality #2 - There is a small sized, moderate intensity, reversible perfusion abnormality that is consistent with ischemia in the distal apex wall(s) in the typical distribution of the LAD territory.    There are no other significant perfusion abnormalities.    The gated perfusion images showed an ejection fraction of 77% at rest. The gated perfusion images showed an ejection fraction of 72% post stress.    The EKG portion of this study is negative for ischemia.    The patient reported no chest pain during the stress test.    There were no arrhythmias during stress.        Echocardiogram from 1/27/2021:  Normal LV size and function  LVEF 55%  Mild MR  Mild AV sclerosis  Mild TR, RVSP 35  Moderate dilated left atrium  Known PFO with left-to-right shunt  IVC dilated     48-hour holter monitor: 2/10/2021:   Sinus rhythm with average heart rate 73 bpm, no episodes of tachycardia, no episodes of bradycardia  Short burst of SVT was 5 beats at 122 bpm  Longest V beat was 3 beats at 97 bpm   No diary returned    Assessment:       Plan  Healthcare maintenance  Influenza Vaccine Declined   Procedure Refused   - Discussed importance and risks vs benefits of Flu Vaccination x 3, patient verbalized understanding and declines at this time. Will revisit at next clinic appointment. Otherwise UTD on vaccinations.   - Patient also not interested in completing previously ordered mammogram and cologuard screenings, described risks vs benefits, patient verbalized understanding.     Hx of PAF (paroxysmal atrial  fibrillation) s/p modified cryo MAZE ablation   Coronary artery disease of native artery of native heart with stable angina pectoris s/p CABG x 3   HTN  Elevated Cr  Hyperkalemia   Hx of Migraines   HA   - Discussed concerning abnormal nuclear stress test given significant PMHx as well as Fhx, highly recommended previously recommended LHC per [facility] despite being largely asymptomatic. Patient verbalized understanding, has agreed to discuss indication at next cardiology appointment. Continue to monitor.   - Per chart review, patient has declined full dose anticoagulation with Xarelto for paroxsymal A fib documented on 12/22/23. Patient verbalized understanding that she poses high risk for venous embolus and thrombus formation and still declines standard of care therapy.   - LDL 80 on 9/19/23, continue Lipitor and Zetia at this time, instructed to complete repeat lipid panel ordered at last clinic appointment, confirmed that she is still fasting today  - Repeat CMP due to significant hyperkalemia at last draw requiring Lokelma x 3 doses prescription and elevated creatinine   - Ordered UA and P:C ratio   - Discontinued scheduled Imdur due to HA occurrence per [facility] recommendation at last clinic appointment, refilled PRN SL Nitroglycerin at home in the event of chest pain at last clinic appointment   - Patient verbalized understanding PRN q5min x 3 dosing and then plan to present to ED in event of continued chest pain   - Refilled rescue Rizatriptan as she has reported this works for her in the past, patient verbalized understanding that this could be a risk due to hx of CAD and CABG and would like to refill medication regardless as this helps her HA   - Counseled patient on avoidance of NSAIDs   - Recommended healthy lifestyle modifications including low Na, low cholesterol diet and physical activity 30min per day x 5 days per week as tolerated   - Continue ASA 81qd, Toprol 100mg qd, Lipitor 80mg qd,  Zetia 10mg qd, Lisinopril 20mg qd     Hypothyroidism   - Continue Synthroid 75mcg qAM   - Repeat TSH and free T4 wnl    Osteoporosis   Vit D Deficiency  - Last DEXA scan 4/15/22 significant for severe osteoporosis of lumbar spine and femurs   - Vit D on 9/19/23 26.1, declined daily Vit D supplement   - Continue Fosamax 70mg qweekly, start date:9/19/22, end date:9/19/27      Strict ED precautions given for chest pain and HA due to extensive hx of CAD and declined repeat LHC as well as chronic full dose anticoagulation.         Patient case and plan of care discussed with Dr. Joe Loya     Disposition: Complete previously ordered lab work today, RTC in 6 months or can call in for sooner appointment if needed     Jose David Cartagena MD   Internal Medicine - [facility]

## 2025-02-26 DIAGNOSIS — M81.0 AGE-RELATED OSTEOPOROSIS WITHOUT CURRENT PATHOLOGICAL FRACTURE: Primary | ICD-10-CM

## 2025-02-26 DIAGNOSIS — Z78.0 ASYMPTOMATIC MENOPAUSE: ICD-10-CM

## 2025-03-20 DIAGNOSIS — M81.0 OSTEOPOROSIS, UNSPECIFIED OSTEOPOROSIS TYPE, UNSPECIFIED PATHOLOGICAL FRACTURE PRESENCE: ICD-10-CM

## 2025-03-20 DIAGNOSIS — E78.5 HYPERLIPIDEMIA, UNSPECIFIED HYPERLIPIDEMIA TYPE: ICD-10-CM

## 2025-03-20 DIAGNOSIS — I10 PRIMARY HYPERTENSION: ICD-10-CM

## 2025-03-20 DIAGNOSIS — G43.909 MIGRAINE WITHOUT STATUS MIGRAINOSUS, NOT INTRACTABLE, UNSPECIFIED MIGRAINE TYPE: ICD-10-CM

## 2025-03-20 DIAGNOSIS — Z76.0 MEDICATION REFILL: ICD-10-CM

## 2025-04-01 RX ORDER — METOPROLOL SUCCINATE 100 MG/1
100 TABLET, EXTENDED RELEASE ORAL DAILY
Qty: 90 TABLET | Refills: 3 | Status: SHIPPED | OUTPATIENT
Start: 2025-04-01

## 2025-05-12 DIAGNOSIS — M81.0 OSTEOPOROSIS, UNSPECIFIED OSTEOPOROSIS TYPE, UNSPECIFIED PATHOLOGICAL FRACTURE PRESENCE: ICD-10-CM

## 2025-05-12 DIAGNOSIS — Z76.0 MEDICATION REFILL: ICD-10-CM

## 2025-05-12 DIAGNOSIS — E78.5 HYPERLIPIDEMIA, UNSPECIFIED HYPERLIPIDEMIA TYPE: ICD-10-CM

## 2025-05-12 DIAGNOSIS — I10 PRIMARY HYPERTENSION: ICD-10-CM

## 2025-05-12 DIAGNOSIS — G43.909 MIGRAINE WITHOUT STATUS MIGRAINOSUS, NOT INTRACTABLE, UNSPECIFIED MIGRAINE TYPE: ICD-10-CM

## 2025-05-13 DIAGNOSIS — Z95.1 HX OF CABG: ICD-10-CM

## 2025-05-13 DIAGNOSIS — E03.9 HYPOTHYROIDISM, UNSPECIFIED TYPE: ICD-10-CM

## 2025-05-13 DIAGNOSIS — R79.9 ABNORMAL FINDING OF BLOOD CHEMISTRY, UNSPECIFIED: ICD-10-CM

## 2025-05-13 DIAGNOSIS — R79.89 ELEVATED SERUM CREATININE: ICD-10-CM

## 2025-05-13 DIAGNOSIS — E55.9 VITAMIN D DEFICIENCY: ICD-10-CM

## 2025-05-13 DIAGNOSIS — E78.5 HYPERLIPIDEMIA, UNSPECIFIED HYPERLIPIDEMIA TYPE: ICD-10-CM

## 2025-05-13 DIAGNOSIS — Z01.89 ROUTINE LAB DRAW: Primary | ICD-10-CM

## 2025-05-13 RX ORDER — LEVOTHYROXINE SODIUM 75 UG/1
75 TABLET ORAL
Qty: 90 TABLET | Refills: 3 | OUTPATIENT
Start: 2025-05-13

## 2025-05-22 ENCOUNTER — HOSPITAL ENCOUNTER (OUTPATIENT)
Dept: RADIOLOGY | Facility: HOSPITAL | Age: 75
Discharge: HOME | End: 2025-05-22
Payer: MEDICARE

## 2025-05-22 DIAGNOSIS — Z01.89 ROUTINE LAB DRAW: ICD-10-CM

## 2025-05-22 DIAGNOSIS — R79.89 ELEVATED SERUM CREATININE: ICD-10-CM

## 2025-05-22 PROCEDURE — 76775 US EXAM ABDO BACK WALL LIM: CPT | Mod: TC

## 2025-06-16 DIAGNOSIS — E78.5 HYPERLIPIDEMIA, UNSPECIFIED HYPERLIPIDEMIA TYPE: ICD-10-CM

## 2025-06-16 DIAGNOSIS — G43.909 MIGRAINE WITHOUT STATUS MIGRAINOSUS, NOT INTRACTABLE, UNSPECIFIED MIGRAINE TYPE: ICD-10-CM

## 2025-06-16 DIAGNOSIS — I10 PRIMARY HYPERTENSION: ICD-10-CM

## 2025-06-16 DIAGNOSIS — Z76.0 MEDICATION REFILL: ICD-10-CM

## 2025-06-16 DIAGNOSIS — M81.0 OSTEOPOROSIS, UNSPECIFIED OSTEOPOROSIS TYPE, UNSPECIFIED PATHOLOGICAL FRACTURE PRESENCE: ICD-10-CM

## 2025-06-17 NOTE — TELEPHONE ENCOUNTER
LV= 9/16/24  NV= 7/22/25    Rx refill request.   
Patient is requesting a refill for zetia. Please advise.  
73

## 2025-06-24 RX ORDER — ATORVASTATIN CALCIUM 80 MG/1
80 TABLET, FILM COATED ORAL DAILY
Qty: 90 TABLET | Refills: 3 | Status: SHIPPED | OUTPATIENT
Start: 2025-06-24

## 2025-07-18 RX ORDER — MIRABEGRON 25 MG/1
25 TABLET, FILM COATED, EXTENDED RELEASE ORAL DAILY
COMMUNITY
Start: 2025-02-26

## 2025-07-22 ENCOUNTER — OFFICE VISIT (OUTPATIENT)
Dept: INTERNAL MEDICINE | Facility: CLINIC | Age: 75
End: 2025-07-22
Payer: MEDICARE

## 2025-07-22 VITALS
DIASTOLIC BLOOD PRESSURE: 88 MMHG | WEIGHT: 112.63 LBS | HEART RATE: 99 BPM | BODY MASS INDEX: 22.74 KG/M2 | TEMPERATURE: 98 F | OXYGEN SATURATION: 97 % | SYSTOLIC BLOOD PRESSURE: 138 MMHG | RESPIRATION RATE: 20 BRPM

## 2025-07-22 DIAGNOSIS — G43.909 MIGRAINE WITHOUT STATUS MIGRAINOSUS, NOT INTRACTABLE, UNSPECIFIED MIGRAINE TYPE: ICD-10-CM

## 2025-07-22 DIAGNOSIS — Z86.69 HISTORY OF MIGRAINE: ICD-10-CM

## 2025-07-22 DIAGNOSIS — Z95.1 HX OF CABG: ICD-10-CM

## 2025-07-22 DIAGNOSIS — Z00.00 HEALTHCARE MAINTENANCE: ICD-10-CM

## 2025-07-22 DIAGNOSIS — I25.118 CORONARY ARTERY DISEASE OF NATIVE ARTERY OF NATIVE HEART WITH STABLE ANGINA PECTORIS: ICD-10-CM

## 2025-07-22 DIAGNOSIS — I10 PRIMARY HYPERTENSION: ICD-10-CM

## 2025-07-22 DIAGNOSIS — Z53.20 PROCEDURE REFUSED: ICD-10-CM

## 2025-07-22 DIAGNOSIS — I48.0 PAF (PAROXYSMAL ATRIAL FIBRILLATION): ICD-10-CM

## 2025-07-22 DIAGNOSIS — E03.9 HYPOTHYROIDISM, UNSPECIFIED TYPE: ICD-10-CM

## 2025-07-22 DIAGNOSIS — E78.5 HYPERLIPIDEMIA, UNSPECIFIED HYPERLIPIDEMIA TYPE: ICD-10-CM

## 2025-07-22 DIAGNOSIS — Z28.21 INFLUENZA VACCINATION DECLINED: ICD-10-CM

## 2025-07-22 DIAGNOSIS — M81.0 OSTEOPOROSIS, UNSPECIFIED OSTEOPOROSIS TYPE, UNSPECIFIED PATHOLOGICAL FRACTURE PRESENCE: Primary | ICD-10-CM

## 2025-07-22 DIAGNOSIS — Z76.0 MEDICATION REFILL: ICD-10-CM

## 2025-07-22 PROCEDURE — 99213 OFFICE O/P EST LOW 20 MIN: CPT | Mod: PBBFAC

## 2025-07-22 RX ORDER — ALENDRONATE SODIUM 70 MG/1
70 TABLET ORAL
Qty: 12 TABLET | Refills: 3 | Status: SHIPPED | OUTPATIENT
Start: 2025-07-22

## 2025-07-22 RX ORDER — METOPROLOL SUCCINATE 100 MG/1
100 TABLET, EXTENDED RELEASE ORAL DAILY
Qty: 90 TABLET | Refills: 3 | Status: SHIPPED | OUTPATIENT
Start: 2025-07-22

## 2025-07-22 RX ORDER — PANTOPRAZOLE SODIUM 40 MG/1
40 TABLET, DELAYED RELEASE ORAL DAILY
Qty: 90 TABLET | Refills: 3 | Status: SHIPPED | OUTPATIENT
Start: 2025-07-22 | End: 2026-07-22

## 2025-07-22 RX ORDER — LISINOPRIL 20 MG/1
20 TABLET ORAL DAILY
Qty: 90 TABLET | Refills: 3 | Status: SHIPPED | OUTPATIENT
Start: 2025-07-22 | End: 2026-07-22

## 2025-07-22 RX ORDER — NITROGLYCERIN 0.4 MG/1
0.4 TABLET SUBLINGUAL EVERY 5 MIN PRN
Qty: 30 TABLET | Refills: 3 | Status: SHIPPED | OUTPATIENT
Start: 2025-07-22 | End: 2026-07-22

## 2025-07-22 RX ORDER — ASPIRIN 81 MG/1
81 TABLET ORAL DAILY
Qty: 90 TABLET | Refills: 3 | Status: SHIPPED | OUTPATIENT
Start: 2025-07-22

## 2025-07-22 RX ORDER — ATORVASTATIN CALCIUM 80 MG/1
80 TABLET, FILM COATED ORAL DAILY
Qty: 90 TABLET | Refills: 3 | Status: SHIPPED | OUTPATIENT
Start: 2025-07-22

## 2025-07-22 RX ORDER — ISOSORBIDE MONONITRATE 30 MG/1
30 TABLET, EXTENDED RELEASE ORAL DAILY
Qty: 90 TABLET | Refills: 3 | Status: SHIPPED | OUTPATIENT
Start: 2025-07-22 | End: 2026-07-17

## 2025-07-22 RX ORDER — PAROXETINE 20 MG/1
20 TABLET, FILM COATED ORAL DAILY
Qty: 90 TABLET | Refills: 3 | Status: SHIPPED | OUTPATIENT
Start: 2025-07-22

## 2025-07-22 RX ORDER — EZETIMIBE 10 MG/1
10 TABLET ORAL DAILY
Qty: 90 TABLET | Refills: 3 | Status: SHIPPED | OUTPATIENT
Start: 2025-07-22 | End: 2026-07-22

## 2025-07-22 RX ORDER — RIZATRIPTAN BENZOATE 10 MG/1
10 TABLET ORAL DAILY PRN
Qty: 15 TABLET | Refills: 3 | Status: SHIPPED | OUTPATIENT
Start: 2025-07-22

## 2025-07-22 NOTE — PROGRESS NOTES
"  IM Clinic Note    Patient Name: Luz Elena Saunders  YOB: 1950   MRN: [id number]  Date: 07/22/2025  Home Address: [address] [place] [address]      Subjective:     Chief Complaint:   Chief Complaint   Patient presents with    Follow-up     Medication Refills       HPI:  Luz Elena Saunders is a 74 y.o. year old female with hx of CAD (s/p CABG x 3 May 2018- LIMA-LAD, SVG- OM and SVG -RCA), Atrial Fibrillation (s/p modified cryo MAZE ablation and TOBI ligation at time of CABG), HTN, hypothyroidism, migraines, neurofibromatosis, osteoporosis who presents to clinic today for routine follow-up. Since last visit HA are controlled with medicaiton. Patient still on Imdur despite cardiology recommendations to discontinue Imdur. Does not have any acute complaints at this time. Denies chest pain,  shortness of breath, trouble breathing, dyspnea on exertion, changes in vision, and focal neurological deficit. Not interested in Flu vaccination or repeat Dexa today as well as previously ordered mammogram and cologuard. Patient reported "she does not want to know" when asked why she declined screening assessments. Denies family history of colon ca. Patient insists of no previous history of tobacco product use.     Previous TSH markedly elevated despite being on 88mcgs qAM. Ok with repeating labs today.     Care Team   [facility] - Follows for CAD, pAF, and HTN. Last seen 12/22/23, no show on 4/26/24 without follow-up reschedule.  No other changes. Hx of abnormal stress test. Declined LHC and Xarelto, held off due to asymptomatic at the time of last f/u.     Past Medical History:   Diagnosis Date    Carotid artery occlusion     Hypertension     Migraine     Neurofibromatosis         Past Surgical History:   Procedure Laterality Date    CORONARY ARTERY BYPASS GRAFT      HYSTERECTOMY         Allergy:  Review of patient's allergies indicates:   Allergen Reactions    Contrast media     Codeine Nausea And Vomiting    Dye " Dermatitis     IV contrast    Tramadol Nausea And Vomiting        Current Medications:    Current Outpatient Medications:     levothyroxine (SYNTHROID) 75 MCG tablet, Take 1 tablet (75 mcg total) by mouth before breakfast., Disp: 90 tablet, Rfl: 3    alendronate (FOSAMAX) 70 MG tablet, Take 1 tablet (70 mg total) by mouth every 7 days. 1 tablet per week, Disp: 12 tablet, Rfl: 3    aspirin (ECOTRIN) 81 MG EC tablet, Take 1 tablet (81 mg total) by mouth once daily., Disp: 90 tablet, Rfl: 3    atorvastatin (LIPITOR) 80 MG tablet, Take 1 tablet (80 mg total) by mouth once daily., Disp: 90 tablet, Rfl: 3    ezetimibe (ZETIA) 10 mg tablet, Take 1 tablet (10 mg total) by mouth once daily., Disp: 90 tablet, Rfl: 3    isosorbide mononitrate (IMDUR) 30 MG 24 hr tablet, Take 1 tablet (30 mg total) by mouth once daily., Disp: 90 tablet, Rfl: 3    lisinopriL (PRINIVIL,ZESTRIL) 20 MG tablet, Take 1 tablet (20 mg total) by mouth once daily., Disp: 90 tablet, Rfl: 3    metoprolol succinate (TOPROL-XL) 100 MG 24 hr tablet, Take 1 tablet (100 mg total) by mouth once daily., Disp: 90 tablet, Rfl: 3    MYRBETRIQ 25 mg Tb24 ER tablet, Take 25 mg by mouth once daily. (Patient not taking: Reported on 7/22/2025), Disp: , Rfl:     nitroGLYCERIN (NITROSTAT) 0.4 MG SL tablet, Place 1 tablet (0.4 mg total) under the tongue every 5 (five) minutes as needed for Chest pain., Disp: 30 tablet, Rfl: 3    pantoprazole (PROTONIX) 40 MG tablet, Take 1 tablet (40 mg total) by mouth once daily., Disp: 90 tablet, Rfl: 3    paroxetine (PAXIL) 20 MG tablet, Take 1 tablet (20 mg total) by mouth once daily., Disp: 90 tablet, Rfl: 3    rizatriptan (MAXALT) 10 MG tablet, Take 1 tablet (10 mg total) by mouth daily as needed for Migraine. TAKE ONE tablet by MOUTH as needed FOR MIGRAINE. MAY REPEAT in TWO hours IF needed. DO not exceed THREE TABLETS in 24 hours, Disp: 15 tablet, Rfl: 3     Social History:   reports that she has never smoked. She has never used  smokeless tobacco. She reports that she does not currently use alcohol after a past usage of about 1.0 standard drink of alcohol per week. She reports that she does not use drugs.   Patient confirms that she has never smoked cigarettes.   Lives with boyfriend for the last 6 years. Supportive home.     Family History   Problem Relation Name Age of Onset    Stroke Mother      Heart disease Father     Older sister  due to MI     Immunization History   Administered Date(s) Administered    COVID-19, MRNA, LN-S, PF (Pfizer) (Purple Cap) 2021, 2021    Influenza - Trivalent - Afluria, Fluzone MDV 2017, 2019    Influenza - Trivalent - Fluzone High Dose - PF (65 years and older) 12/10/2020, 2022    Pneumococcal Conjugate - 13 Valent 2019    Pneumococcal Polysaccharide - 23 Valent 12/10/2020    Tdap 2023    Zoster Recombinant 2023, 2024       Review of Systems   Constitutional:  Negative for chills, fever and weight loss.   Eyes:  Negative for blurred vision and double vision.   Respiratory:  Negative for cough and shortness of breath.    Cardiovascular:  Negative for chest pain, palpitations and leg swelling.   Gastrointestinal:  Negative for abdominal pain, blood in stool, heartburn, melena, nausea and vomiting.   Musculoskeletal:  Negative for falls.   Neurological:  Negative for dizziness, focal weakness, seizures, loss of consciousness, weakness and headaches.   Endo/Heme/Allergies:  Negative for polydipsia.   Psychiatric/Behavioral:  Negative for depression and substance abuse. The patient is not nervous/anxious and does not have insomnia.        Objective:      Physical Exam  Vitals:    25 1341   BP: 138/88   BP Location: Left arm   Patient Position: Sitting   Pulse: 99   Resp: 20   Temp: 98.4 °F (36.9 °C)   TempSrc: Oral   SpO2: 97%   Weight: 51.1 kg (112 lb 9.6 oz)        Wt Readings from Last 3 Encounters:   25 51.1 kg (112 lb 9.6 oz)    09/16/24 49.5 kg (109 lb 3.2 oz)   03/04/24 47.7 kg (105 lb 3.2 oz)       Physical Exam  Vitals and nursing note reviewed.   Constitutional:       General: She is not in acute distress.     Appearance: She is not ill-appearing.   Neck:      Vascular: No carotid bruit.      Comments: No JVD  Cardiovascular:      Rate and Rhythm: Normal rate and regular rhythm.      Heart sounds: Murmur heard.   Pulmonary:      Effort: Pulmonary effort is normal. No respiratory distress.      Breath sounds: No wheezing.      Comments: No pulmonary crackles  Musculoskeletal:      Cervical back: Normal range of motion.      Right lower leg: No edema.      Left lower leg: No edema.   Skin:     General: Skin is warm and dry.      Capillary Refill: Capillary refill takes less than 2 seconds.      Coloration: Skin is pale.      Findings: Lesion present. No bruising.      Comments: +Neurofibromas throughout skin, chronic appearing    Neurological:      Mental Status: She is alert and oriented to person, place, and time. Mental status is at baseline.      Motor: No weakness.          Body mass index is 22.74 kg/m².      Results for orders placed during the hospital encounter of 10/12/22     Nuclear Stress - Cardiology Interpreted     Interpretation Summary    Abnormal myocardial perfusion scan.    There are two significant perfusion abnormalities.    Perfusion Abnormality #1 - There is a mild intensity, moderate sized, mostly reversible perfusion abnormality with some fixed areas in the basal to distal anterolateral wall(s) in the typical distribution of the LCX territory.    Perfusion Abnormality #2 - There is a small sized, moderate intensity, reversible perfusion abnormality that is consistent with ischemia in the distal apex wall(s) in the typical distribution of the LAD territory.    There are no other significant perfusion abnormalities.    The gated perfusion images showed an ejection fraction of 77% at rest. The gated perfusion  images showed an ejection fraction of 72% post stress.    The EKG portion of this study is negative for ischemia.    The patient reported no chest pain during the stress test.    There were no arrhythmias during stress.        Echocardiogram from 1/27/2021:  Normal LV size and function  LVEF 55%  Mild MR  Mild AV sclerosis  Mild TR, RVSP 35  Moderate dilated left atrium  Known PFO with left-to-right shunt  IVC dilated     48-hour holter monitor: 2/10/2021:   Sinus rhythm with average heart rate 73 bpm, no episodes of tachycardia, no episodes of bradycardia  Short burst of SVT was 5 beats at 122 bpm  Longest V beat was 3 beats at 97 bpm   No diary returned    Assessment:       Plan  Healthcare maintenance  Influenza Vaccine Declined   Osteoporosis   Vit D Deficiency  Procedures Refused   - Discussed importance and risks vs benefits of repeat Dexa scan and colonoscopy, patient verbalized understanding and declines at this time. Will revisit at next clinic appointment. Otherwise UTD on other measures. Continues to decline vaccinations.   - Patient also not interested in completing previously ordered mammogram and cologuard screenings, described risks vs benefits, patient verbalized understanding.   - Last DEXA scan 4/15/22 significant for severe osteoporosis of lumbar spine and femurs   - Vit D on 9/19/23 26.1, declined daily Vit D supplement   - Continue Fosamax 70mg qweekly, start date:9/19/22, end date:9/19/27    Hx of PAF (paroxysmal atrial fibrillation) s/p modified cryo MAZE ablation   Coronary artery disease of native artery of native heart with stable angina pectoris s/p CABG x 3   HTN  Hx of Migraines   - Discussed concerning abnormal nuclear stress test given significant PMHx as well as Fhx, highly recommended previously recommended LHC per [facility] despite being largely asymptomatic. Patient verbalized understanding, has agreed to discuss indication at next cardiology appointment. Continue to monitor.    - Per chart review, patient has declined full dose anticoagulation with Xarelto for paroxsymal A fib documented on 12/22/23. Patient verbalized understanding that she poses high risk for venous embolus and thrombus formation and still declines standard of care therapy.   - LDL 80 on 9/19/23, continue Lipitor and Zetia at this time, repeat ordered today. LDL goal <55 given cardiac history   - Still takes Imdur even though [facility] has recommended against this   - Patient verbalized understanding PRN q5min x 3 dosing and then plan to present to ED in event of continued chest pain   - Refilled rescue Rizatriptan as she has reported this works for her in the past, patient verbalized understanding that this could be a risk due to hx of CAD and CABG and would like to refill medication regardless as this helps her HA   - Counseled patient on avoidance of NSAIDs   - Recommended healthy lifestyle modifications including low Na, low cholesterol diet and physical activity 30min per day x 5 days per week as tolerated   - Continue ASA 81qd, Toprol 100mg qd, Lipitor 80mg qd, Zetia 10mg qd, Lisinopril 20mg qd     Hypothyroidism   - Continue Synthroid 88mcg qAM   - Repeat TSH today as previous was markedly elevated, will need up-titration of Synthroid if this continues to be the case        Strict ED precautions given for chest pain and HA due to extensive hx of CAD and declined repeat LHC as well as chronic full dose anticoagulation.         Patient case and plan of care discussed with Dr. Oneyda Marcum      Disposition: RTC in 6 months or can call in for sooner appointment if needed     Jose David Cartagena MD   Internal Medicine - [facility]

## 2025-07-23 ENCOUNTER — LAB VISIT (OUTPATIENT)
Dept: LAB | Facility: HOSPITAL | Age: 75
End: 2025-07-23
Payer: MEDICARE

## 2025-07-23 DIAGNOSIS — E03.9 HYPOTHYROIDISM, UNSPECIFIED TYPE: ICD-10-CM

## 2025-07-23 LAB — TSH SERPL-ACNC: 90.53 UIU/ML (ref 0.35–4.94)

## 2025-07-23 PROCEDURE — 84443 ASSAY THYROID STIM HORMONE: CPT

## 2025-07-23 PROCEDURE — 36415 COLL VENOUS BLD VENIPUNCTURE: CPT

## 2025-07-23 NOTE — PROGRESS NOTES
I have reviewed the notes, assessments, and management plan performed by resident, I concur with her documentation of Luz Elena Saunders.  Date of Service: 7/22/2025

## 2025-07-24 ENCOUNTER — RESULTS FOLLOW-UP (OUTPATIENT)
Dept: INTERNAL MEDICINE | Facility: CLINIC | Age: 75
End: 2025-07-24
Payer: MEDICARE

## 2025-07-24 DIAGNOSIS — Z76.0 MEDICATION REFILL: ICD-10-CM

## 2025-07-24 DIAGNOSIS — E03.9 HYPOTHYROIDISM, UNSPECIFIED TYPE: Primary | ICD-10-CM

## 2025-07-24 RX ORDER — LEVOTHYROXINE SODIUM 125 UG/1
125 TABLET ORAL
Qty: 30 TABLET | Refills: 5 | Status: SHIPPED | OUTPATIENT
Start: 2025-07-24 | End: 2026-01-20

## 2025-07-24 NOTE — TELEPHONE ENCOUNTER
Repeat TSH 90 despite compliance with Synthroid 88mcgs qAM. Will increase to 125mcgs qAM with repeat TSH in 6 weeks. Please remind patient to take this qAM on empty stomach.